# Patient Record
Sex: FEMALE | Race: WHITE | Employment: OTHER | URBAN - METROPOLITAN AREA
[De-identification: names, ages, dates, MRNs, and addresses within clinical notes are randomized per-mention and may not be internally consistent; named-entity substitution may affect disease eponyms.]

---

## 2017-03-20 ENCOUNTER — TELEPHONE (OUTPATIENT)
Dept: OBGYN CLINIC | Facility: CLINIC | Age: 32
End: 2017-03-20

## 2017-03-20 NOTE — TELEPHONE ENCOUNTER
Pt confirms message below and LMP of 2/17/17. Pt stated she is not taking PNV yet and pt was instructed to buy PNV with [facility]. Pt informed that our practice has 6 doctors (2 male, 4 female) and pt will be required to see all doctors throughout care.  Pt cynthia

## 2017-04-15 ENCOUNTER — NURSE ONLY (OUTPATIENT)
Dept: OBGYN CLINIC | Facility: CLINIC | Age: 32
End: 2017-04-15

## 2017-04-15 ENCOUNTER — LAB ENCOUNTER (OUTPATIENT)
Dept: LAB | Facility: HOSPITAL | Age: 32
End: 2017-04-15
Attending: OBSTETRICS & GYNECOLOGY
Payer: COMMERCIAL

## 2017-04-15 VITALS — WEIGHT: 153.38 LBS | HEIGHT: 66.5 IN | BODY MASS INDEX: 24.36 KG/M2

## 2017-04-15 DIAGNOSIS — Z34.81 ENCOUNTER FOR SUPERVISION OF OTHER NORMAL PREGNANCY IN FIRST TRIMESTER: Primary | ICD-10-CM

## 2017-04-15 DIAGNOSIS — Z34.81 ENCOUNTER FOR SUPERVISION OF OTHER NORMAL PREGNANCY IN FIRST TRIMESTER: ICD-10-CM

## 2017-04-15 DIAGNOSIS — Z32.01 PREGNANCY EXAMINATION OR TEST, POSITIVE RESULT: ICD-10-CM

## 2017-04-15 PROCEDURE — 87389 HIV-1 AG W/HIV-1&-2 AB AG IA: CPT

## 2017-04-15 PROCEDURE — 81025 URINE PREGNANCY TEST: CPT | Performed by: OBSTETRICS & GYNECOLOGY

## 2017-04-15 PROCEDURE — 86762 RUBELLA ANTIBODY: CPT

## 2017-04-15 PROCEDURE — 86780 TREPONEMA PALLIDUM: CPT

## 2017-04-15 PROCEDURE — 86901 BLOOD TYPING SEROLOGIC RH(D): CPT

## 2017-04-15 PROCEDURE — 85025 COMPLETE CBC W/AUTO DIFF WBC: CPT

## 2017-04-15 PROCEDURE — 87340 HEPATITIS B SURFACE AG IA: CPT

## 2017-04-15 PROCEDURE — 86900 BLOOD TYPING SEROLOGIC ABO: CPT

## 2017-04-15 PROCEDURE — 36415 COLL VENOUS BLD VENIPUNCTURE: CPT

## 2017-04-15 PROCEDURE — 86803 HEPATITIS C AB TEST: CPT

## 2017-04-15 PROCEDURE — 86850 RBC ANTIBODY SCREEN: CPT

## 2017-04-15 RX ORDER — PRENATAL VIT/IRON FUM/FOLIC AC 27MG-0.8MG
1 TABLET ORAL DAILY
COMMUNITY
End: 2018-02-01 | Stop reason: ALTCHOICE

## 2017-04-15 NOTE — PROGRESS NOTES
Pt seen for OBN appt today with no complaints. Normal PN labs and Hep C ordered. Pt advised all labs must be completed and resulted prior to MD appt. NPN appt with MD scheduled on 4/17/17 with MELA.   Pt states with last pregnancy she had significant vagi greater than 35 No    Canavan Disease  No    Cystic Fibrosis No    Down Syndrome No    Hemophilia No    Estero's Chorea No    Mental Retardation/Autism No    Muscular Dystrophy No    Neural tube defects No    Sickle Cell Disease or trait No    Damián-Sach

## 2017-04-17 ENCOUNTER — INITIAL PRENATAL (OUTPATIENT)
Dept: OBGYN CLINIC | Facility: CLINIC | Age: 32
End: 2017-04-17

## 2017-04-17 VITALS
SYSTOLIC BLOOD PRESSURE: 97 MMHG | WEIGHT: 155 LBS | BODY MASS INDEX: 25 KG/M2 | DIASTOLIC BLOOD PRESSURE: 61 MMHG | HEART RATE: 69 BPM

## 2017-04-17 DIAGNOSIS — Z34.81 ENCOUNTER FOR SUPERVISION OF OTHER NORMAL PREGNANCY IN FIRST TRIMESTER: Primary | ICD-10-CM

## 2017-04-17 PROCEDURE — 76815 OB US LIMITED FETUS(S): CPT | Performed by: OBSTETRICS & GYNECOLOGY

## 2017-04-18 ENCOUNTER — TELEPHONE (OUTPATIENT)
Dept: OBGYN CLINIC | Facility: CLINIC | Age: 32
End: 2017-04-18

## 2017-04-18 DIAGNOSIS — Z34.81 ENCOUNTER FOR SUPERVISION OF OTHER NORMAL PREGNANCY IN FIRST TRIMESTER: Primary | ICD-10-CM

## 2017-04-19 ENCOUNTER — TELEPHONE (OUTPATIENT)
Dept: PERINATAL CARE | Facility: HOSPITAL | Age: 32
End: 2017-04-19

## 2017-04-19 NOTE — TELEPHONE ENCOUNTER
[facility] and she can call [phone] to schedule an appointment.

## 2017-04-19 NOTE — PROGRESS NOTES
Normal Pap 1/18/16. Wishes for FTS. Gc/Chl screen done. Never did urine culture because \"gave urine sample\" in office right before.  RTC 4 wks

## 2017-05-15 ENCOUNTER — ROUTINE PRENATAL (OUTPATIENT)
Dept: OBGYN CLINIC | Facility: CLINIC | Age: 32
End: 2017-05-15

## 2017-05-15 VITALS
WEIGHT: 159.63 LBS | HEART RATE: 90 BPM | DIASTOLIC BLOOD PRESSURE: 68 MMHG | SYSTOLIC BLOOD PRESSURE: 101 MMHG | BODY MASS INDEX: 25 KG/M2

## 2017-05-15 DIAGNOSIS — Z34.91 ENCOUNTER FOR SUPERVISION OF NORMAL PREGNANCY IN FIRST TRIMESTER, UNSPECIFIED GRAVIDITY: Primary | ICD-10-CM

## 2017-05-17 ENCOUNTER — HOSPITAL ENCOUNTER (OUTPATIENT)
Dept: PERINATAL CARE | Facility: HOSPITAL | Age: 32
Discharge: HOME | End: 2017-05-17
Attending: OBSTETRICS & GYNECOLOGY
Payer: COMMERCIAL

## 2017-05-17 VITALS — SYSTOLIC BLOOD PRESSURE: 105 MMHG | HEART RATE: 80 BPM | DIASTOLIC BLOOD PRESSURE: 75 MMHG

## 2017-05-17 DIAGNOSIS — Z36.9 FIRST TRIMESTER SCREENING: Primary | ICD-10-CM

## 2017-05-17 DIAGNOSIS — F17.200 SMOKER: ICD-10-CM

## 2017-05-17 DIAGNOSIS — Z36.9 FIRST TRIMESTER SCREENING: ICD-10-CM

## 2017-05-17 PROCEDURE — 99241 OFFICE CONSULTATION,LEVEL I: CPT | Performed by: OBSTETRICS & GYNECOLOGY

## 2017-05-17 PROCEDURE — 76813 OB US NUCHAL MEAS 1 GEST: CPT | Performed by: OBSTETRICS & GYNECOLOGY

## 2017-05-17 NOTE — PROGRESS NOTES
Outpatient Maternal-Fetal Medicine Consultation    Dear Dr. Cornelio Duarte,    Thank you for requesting ultrasound evaluation and maternal fetal medicine consultation on your patient Chapito Hernández.   As you are aware she is a 36year old female with a Cross preg non-tender  Extremities: non-tender, no edema    OBSTETRIC ULTRASOUND  A first trimester ultrasound was performed prior to the consultation which I interpreted and discussed with the patient and her , Gali Escamilla.   The CRL is consistent with dates and the

## 2017-05-17 NOTE — PROGRESS NOTES
Pt for 1st tri screening  Hx varicosities in lower exterm  Denies pregnancy complaints  ntd lab card lot # [id number]

## 2017-05-22 ENCOUNTER — TELEPHONE (OUTPATIENT)
Dept: PERINATAL CARE | Facility: HOSPITAL | Age: 32
End: 2017-05-22

## 2017-05-22 NOTE — TELEPHONE ENCOUNTER
First trimester screen results reviewed by Dr. Bee Egan for Down Syndrome is 1 in 2688  Risk for Trisomy 13 and 18 in 1 in > 10,000  Laddarci Pickering contacted by phone and was given the results. She verbalized understanding.   Report sent for scanning into

## 2017-06-12 ENCOUNTER — ROUTINE PRENATAL (OUTPATIENT)
Dept: OBGYN CLINIC | Facility: CLINIC | Age: 32
End: 2017-06-12

## 2017-06-12 VITALS
SYSTOLIC BLOOD PRESSURE: 102 MMHG | WEIGHT: 162 LBS | HEART RATE: 90 BPM | DIASTOLIC BLOOD PRESSURE: 68 MMHG | BODY MASS INDEX: 26 KG/M2

## 2017-06-12 DIAGNOSIS — Z34.92 ENCOUNTER FOR SUPERVISION OF NORMAL PREGNANCY IN SECOND TRIMESTER, UNSPECIFIED GRAVIDITY: Primary | ICD-10-CM

## 2017-06-19 ENCOUNTER — ROUTINE PRENATAL (OUTPATIENT)
Dept: OBGYN CLINIC | Facility: CLINIC | Age: 32
End: 2017-06-19

## 2017-06-19 VITALS
BODY MASS INDEX: 26 KG/M2 | WEIGHT: 163 LBS | SYSTOLIC BLOOD PRESSURE: 94 MMHG | DIASTOLIC BLOOD PRESSURE: 60 MMHG | HEART RATE: 69 BPM

## 2017-06-19 DIAGNOSIS — Z34.82 ENCOUNTER FOR SUPERVISION OF OTHER NORMAL PREGNANCY IN SECOND TRIMESTER: Primary | ICD-10-CM

## 2017-06-19 PROCEDURE — 81002 URINALYSIS NONAUTO W/O SCOPE: CPT | Performed by: OBSTETRICS & GYNECOLOGY

## 2017-06-19 PROCEDURE — 99213 OFFICE O/P EST LOW 20 MIN: CPT | Performed by: OBSTETRICS & GYNECOLOGY

## 2017-06-19 NOTE — PROGRESS NOTES
Slight pinkish d/c. External cx 1 cm but firmly closed w/in 1/2 cm -- cx long. Pelvic rest until u/s.  (+) vaginal variocosities

## 2017-07-10 ENCOUNTER — HOSPITAL ENCOUNTER (OUTPATIENT)
Dept: ULTRASOUND IMAGING | Age: 32
Discharge: HOME | End: 2017-07-10
Attending: OBSTETRICS & GYNECOLOGY
Payer: COMMERCIAL

## 2017-07-10 DIAGNOSIS — Z34.92 ENCOUNTER FOR SUPERVISION OF NORMAL PREGNANCY IN SECOND TRIMESTER, UNSPECIFIED GRAVIDITY: ICD-10-CM

## 2017-07-10 PROCEDURE — 76805 OB US >/= 14 WKS SNGL FETUS: CPT | Performed by: OBSTETRICS & GYNECOLOGY

## 2017-07-11 ENCOUNTER — ROUTINE PRENATAL (OUTPATIENT)
Dept: OBGYN CLINIC | Facility: CLINIC | Age: 32
End: 2017-07-11

## 2017-07-11 VITALS
HEART RATE: 72 BPM | SYSTOLIC BLOOD PRESSURE: 112 MMHG | WEIGHT: 169.63 LBS | DIASTOLIC BLOOD PRESSURE: 76 MMHG | BODY MASS INDEX: 27 KG/M2

## 2017-07-11 DIAGNOSIS — Z34.92 ENCOUNTER FOR SUPERVISION OF NORMAL PREGNANCY IN SECOND TRIMESTER, UNSPECIFIED GRAVIDITY: Primary | ICD-10-CM

## 2017-07-11 LAB
MULTISTIX LOT#: NORMAL NUMERIC
PH, URINE: 6 (ref 4.5–8)
SPECIFIC GRAVITY: 1.01 (ref 1–1.03)
UROBILINOGEN,SEMI-QN: 0 MG/DL (ref 0–1.9)

## 2017-07-11 NOTE — PROGRESS NOTES
Feeling some vaginal pressure. No spotting or cramping. Reviewed hydration and rest. Reviewed support/compression for varicose veins.  Reviewed 20 wk US   RTC 4 wks

## 2017-07-12 ENCOUNTER — TELEPHONE (OUTPATIENT)
Dept: PEDIATRICS CLINIC | Facility: CLINIC | Age: 32
End: 2017-07-12

## 2017-07-12 NOTE — TELEPHONE ENCOUNTER
Pt needs a letter from DR. Malcolm confirming her pregnancy and stating her due date for work purposes. Pt would like the letter faxed to her job #[phone] ATTN:Pennie    Pt also states fax machine will start working after 1 pm 7/12.    Ok to lv vm once se

## 2017-07-15 ENCOUNTER — TELEPHONE (OUTPATIENT)
Dept: OBGYN CLINIC | Facility: CLINIC | Age: 32
End: 2017-07-15

## 2017-07-15 DIAGNOSIS — O42.90 PREMATURE RUPTURE OF MEMBRANES, UNSPECIFIED DURATION TO ONSET OF LABOR, UNSPECIFIED GESTATIONAL AGE: Primary | ICD-10-CM

## 2017-07-15 PROBLEM — Z34.90 PREGNANCY, OBSTETRICAL CARE: Status: ACTIVE | Noted: 2017-07-15

## 2017-07-15 PROBLEM — Z34.90 PREGNANCY, OBSTETRICAL CARE (HCC): Status: ACTIVE | Noted: 2017-07-15

## 2017-07-15 NOTE — TELEPHONE ENCOUNTER
MESSAGE REVIEWED WITH CAP AND PT IS TO STAY THERE AND GET IV ANTIBIOTICS FIRST. WILL SEND TO [facility] ASAP WHEN SHE RETURNS. PT NOTIFIED OF THIS INFO. BELIEVES SHE WILL BE HOME Monday EVENING.   PT AWARE WE WILL MAKE ARRANGEMENTS FOR HER TO BE SEEN ASAP WITH

## 2017-07-15 NOTE — TELEPHONE ENCOUNTER
Per the pt she is 21 weeks pregnant, and her membrane ruptured. The pt states that she is out of state, and has been admitted to the hospital.  Please advise.

## 2017-07-15 NOTE — TELEPHONE ENCOUNTER
Pt is 21w1d, calling to report her water broke last night and she is in [place].  Pt states she was admitted to [address] [place] [address] last night around 5 pm. Pt was told she can either do 48 hours of IV abx then fly home to continue the oral abx or fly home now and

## 2017-07-15 NOTE — TELEPHONE ENCOUNTER
Pageliliana while on call. Pt in [place] and [facility]. She is getting IV Abx until Monday evening. She will fly home then and be on Oral Abx. She has requested to see MFM at [facility]).  Pt will need apt immediately on Tuesday or Wednesday to discuss plan of ca

## 2017-07-17 NOTE — TELEPHONE ENCOUNTER
NOTIFIED PT THAT NETTA WILL CALL HER ONCE THE ORDER HAS BEEN FAXED TO [facility] AND THE URGENT REFERRAL IS IN PROCESS. I DID GIVE HER [facility] PHONE NUMBER. PT AWARE SHE WILL NEED TO SEE MFM FIRST FOR CONSULT AND RECS AND THEN CARE WILL BE TRANSFERRED.   ONCE

## 2017-07-18 ENCOUNTER — HOSPITAL ENCOUNTER (OUTPATIENT)
Dept: PERINATAL CARE | Facility: HOSPITAL | Age: 32
Discharge: HOME | End: 2017-07-18
Attending: OBSTETRICS & GYNECOLOGY
Payer: COMMERCIAL

## 2017-07-18 VITALS
SYSTOLIC BLOOD PRESSURE: 114 MMHG | TEMPERATURE: 99 F | HEART RATE: 94 BPM | DIASTOLIC BLOOD PRESSURE: 70 MMHG | WEIGHT: 166.31 LBS | BODY MASS INDEX: 26 KG/M2

## 2017-07-18 DIAGNOSIS — Z34.92 PREGNANCY, OBSTETRICAL CARE, SECOND TRIMESTER: ICD-10-CM

## 2017-07-18 DIAGNOSIS — O42.90 PROM (PREMATURE RUPTURE OF MEMBRANES): ICD-10-CM

## 2017-07-18 DIAGNOSIS — O42.112 PRETERM PREMATURE RUPTURE OF MEMBRANES WITH ONSET OF LABOR MORE THAN 24 HOURS FOLLOWING RUPTURE IN SECOND TRIMESTER: ICD-10-CM

## 2017-07-18 DIAGNOSIS — O42.90 PROM (PREMATURE RUPTURE OF MEMBRANES): Primary | ICD-10-CM

## 2017-07-18 PROCEDURE — 76811 OB US DETAILED SNGL FETUS: CPT | Performed by: OBSTETRICS & GYNECOLOGY

## 2017-07-18 PROCEDURE — 99243 OFF/OP CNSLTJ NEW/EST LOW 30: CPT | Performed by: OBSTETRICS & GYNECOLOGY

## 2017-07-18 RX ORDER — AMOXICILLIN 500 MG/1
500 CAPSULE ORAL 3 TIMES DAILY
COMMUNITY
End: 2017-07-25 | Stop reason: ALTCHOICE

## 2017-07-18 RX ORDER — AZITHROMYCIN 250 MG/1
250 TABLET, FILM COATED ORAL DAILY
COMMUNITY
End: 2017-07-25 | Stop reason: ALTCHOICE

## 2017-07-18 NOTE — PROGRESS NOTES
Outpatient Maternal-Fetal Medicine Consultation    Dear Dr. Montrell Haywood,    Thank you for requesting ultrasound evaluation and maternal fetal medicine consultation on your patient Gely Clifton.   As you are aware she is a 36year old female with a [place] pr polypectomy; Mild intermittent childhood asthma without complication; Smoker (); and Varicose veins of legs. Past Surgical History  The patient  has a past surgical history that includes .     Family History  The patient has no family status smoking. Approximately one-third of women with PPROM develop potentially serious infections, such as intraamniotic infection (chorioamnionitis and funisitis), endometritis, or septicemia.    The fetus and  are at greater risk of PPROM-related than 34 weeks of gestation. Between 34-36 weeks it is more controversial because infection is now a greater risk. Some physicians will test for fetal lung maturity if a vaginal sample can be obtained.   A systematic review of studies of fetal outcome f

## 2017-07-18 NOTE — PROGRESS NOTES
Pt for ltd U/S  And MFM consult  Hx PPROM 7/14/17  Tx with abx   Denies leaking  States fetal mvmt  Pt states pink discharge

## 2017-07-21 ENCOUNTER — ROUTINE PRENATAL (OUTPATIENT)
Dept: OBGYN CLINIC | Facility: CLINIC | Age: 32
End: 2017-07-21

## 2017-07-21 ENCOUNTER — TELEPHONE (OUTPATIENT)
Dept: OBGYN CLINIC | Facility: CLINIC | Age: 32
End: 2017-07-21

## 2017-07-21 VITALS
SYSTOLIC BLOOD PRESSURE: 106 MMHG | HEART RATE: 120 BPM | WEIGHT: 164.81 LBS | BODY MASS INDEX: 26 KG/M2 | DIASTOLIC BLOOD PRESSURE: 75 MMHG

## 2017-07-21 DIAGNOSIS — Z3A.22 22 WEEKS GESTATION OF PREGNANCY: Primary | ICD-10-CM

## 2017-07-21 LAB
MULTISTIX LOT#: NORMAL NUMERIC
SPECIFIC GRAVITY: 1.01 (ref 1–1.03)
UROBILINOGEN,SEMI-QN: 0 MG/DL (ref 0–1.9)

## 2017-07-21 NOTE — TELEPHONE ENCOUNTER
ON CALL--  Spoke with pt at 0730. PPROM at 21 wks. Still leaking fluid. Has seen pink discharge. This am, noticed more red blood. No cramping or pelvic pressure.   Will see pt at office this am.

## 2017-07-21 NOTE — PROGRESS NOTES
Problem visit---Pt presents today c/o VB. Has had pink tinged LOF since PPROM dx last week however this morning had bright red watery bleeding, filling half her pad. Denies ctx, cramping, abdominal pain, fevers, foul smelling discharge. Reports good FM.

## 2017-07-22 ENCOUNTER — HOSPITAL ENCOUNTER (OUTPATIENT)
Facility: HOSPITAL | Age: 32
Setting detail: OBSERVATION
Discharge: HOME | End: 2017-07-22
Attending: OBSTETRICS & GYNECOLOGY | Admitting: OBSTETRICS & GYNECOLOGY
Payer: COMMERCIAL

## 2017-07-22 VITALS
DIASTOLIC BLOOD PRESSURE: 66 MMHG | RESPIRATION RATE: 16 BRPM | BODY MASS INDEX: 26.36 KG/M2 | HEIGHT: 66 IN | HEART RATE: 94 BPM | WEIGHT: 164 LBS | TEMPERATURE: 98 F | SYSTOLIC BLOOD PRESSURE: 104 MMHG

## 2017-07-22 PROBLEM — Z34.90 PREGNANCY: Status: ACTIVE | Noted: 2017-07-22

## 2017-07-22 PROBLEM — Z34.90 PREGNANCY (HCC): Status: ACTIVE | Noted: 2017-07-22

## 2017-07-22 LAB
BASOPHILS # BLD AUTO: 0.04 X10(3) UL (ref 0–0.1)
BASOPHILS NFR BLD AUTO: 0.3 %
EOSINOPHIL # BLD AUTO: 0.17 X10(3) UL (ref 0–0.3)
EOSINOPHIL NFR BLD AUTO: 1.3 %
ERYTHROCYTE [DISTWIDTH] IN BLOOD BY AUTOMATED COUNT: 12.5 % (ref 11.5–16)
HCT VFR BLD AUTO: 34.7 % (ref 34–50)
HGB BLD-MCNC: 12.1 G/DL (ref 12–16)
IMMATURE GRANULOCYTE COUNT: 0.27 X10(3) UL (ref 0–1)
IMMATURE GRANULOCYTE RATIO %: 2.1 %
LYMPHOCYTES # BLD AUTO: 2.23 X10(3) UL (ref 0.9–4)
LYMPHOCYTES NFR BLD AUTO: 17.5 %
MCH RBC QN AUTO: 31.1 PG (ref 27–33.2)
MCHC RBC AUTO-ENTMCNC: 34.9 G/DL (ref 31–37)
MCV RBC AUTO: 89.2 FL (ref 81–100)
MONOCYTES # BLD AUTO: 0.9 X10(3) UL (ref 0.1–0.6)
MONOCYTES NFR BLD AUTO: 7.1 %
NEUTROPHIL ABS PRELIM: 9.14 X10 (3) UL (ref 1.3–6.7)
NEUTROPHILS # BLD AUTO: 9.14 X10(3) UL (ref 1.3–6.7)
NEUTROPHILS NFR BLD AUTO: 71.7 %
PLATELET # BLD AUTO: 271 10(3)UL (ref 150–450)
RBC # BLD AUTO: 3.89 X10(6)UL (ref 3.8–5.1)
RED CELL DISTRIBUTION WIDTH-SD: 40.5 FL (ref 35.1–46.3)
WBC # BLD AUTO: 12.8 X10(3) UL (ref 4–13)

## 2017-07-22 PROCEDURE — 99219 INITIAL OBSERVATION CARE,LEVL II: CPT | Performed by: OBSTETRICS & GYNECOLOGY

## 2017-07-22 NOTE — NST
Nonstress Test   Patient: Pérez Benjamin    Gestation: 22w1d    NST:                                            Baseline: 175 BPM                                                           Nonstress Test Interpretation: Appropriate for gestational age Falguni Skinner

## 2017-07-22 NOTE — PROGRESS NOTES
Pt is d/c'd home in stable condition. Both written and verbal instructions provided. Questions answered. Pt verbalizes understanding and agreement.

## 2017-07-22 NOTE — H&P
35 YO  with an BEAU of 2017, 22w 1d EGA admitted due to abdominal cramping. Hx PPROM on 2017 while in [place]. Was hospitalized for IV antibiotics and then discharged to return home. finished oral antibiotics today.  Was seen b discharge. Cervix visually closed. No lesions. No SVE done. cm dilated. Extremities:  No tenderness or edema. IMP:  22 week pregnancy with pre viable PPROM. PLAN:  CBC. Expect discharge.  To call primary ob group or our group if abdominal pain or con

## 2017-07-22 NOTE — PROGRESS NOTES
CHASTITY falcon a  at 22 1/7 wk iup who known SROM on  @1900 who is brought to room triage -5 w/ c/o cramping since 1200 today. Pt has noted leaking of clear fluid with pink tinge.  Pt reports completing her abx today and a POC to admit at apporx 23 wk.  UA o

## 2017-07-25 ENCOUNTER — ROUTINE PRENATAL (OUTPATIENT)
Dept: OBGYN CLINIC | Facility: CLINIC | Age: 32
End: 2017-07-25

## 2017-07-25 ENCOUNTER — HOSPITAL ENCOUNTER (OUTPATIENT)
Facility: HOSPITAL | Age: 32
Setting detail: OBSERVATION
Discharge: HOME | End: 2017-07-25
Attending: OBSTETRICS & GYNECOLOGY | Admitting: OBSTETRICS & GYNECOLOGY
Payer: COMMERCIAL

## 2017-07-25 VITALS
WEIGHT: 168 LBS | HEART RATE: 94 BPM | DIASTOLIC BLOOD PRESSURE: 66 MMHG | SYSTOLIC BLOOD PRESSURE: 99 MMHG | BODY MASS INDEX: 27 KG/M2

## 2017-07-25 VITALS — TEMPERATURE: 98 F

## 2017-07-25 DIAGNOSIS — Z3A.22 22 WEEKS GESTATION OF PREGNANCY: Primary | ICD-10-CM

## 2017-07-25 PROBLEM — O36.8390 ABNORMALITY IN FETAL HEART RATE/RHYTHM, ANTEPARTUM: Status: ACTIVE | Noted: 2017-07-25

## 2017-07-25 PROBLEM — O36.8390: Status: ACTIVE | Noted: 2017-07-25

## 2017-07-25 LAB
MULTISTIX LOT#: NORMAL NUMERIC
PH, URINE: 6 (ref 4.5–8)
SPECIFIC GRAVITY: 1.02 (ref 1–1.03)
UROBILINOGEN,SEMI-QN: 0.2 MG/DL (ref 0–1.9)

## 2017-07-25 PROCEDURE — 99212 OFFICE O/P EST SF 10 MIN: CPT

## 2017-07-25 NOTE — TRIAGE
[facility]      Triage Note    Blair Bowers Patient Status:  Observation    10/15/1985 MRN [id number]   Location [address]  Attending Raj Young MD   Hosp Day # 0 [facility]

## 2017-07-25 NOTE — PROGRESS NOTES
Pt denies fevers and chills, fundal tenderness, foul smelling discharge, vaginal bleeding. She denies contractions and cramping. She feels fetal movement. When doppling the baby fetal heart tones 130s and then into the 70s.  Rolled patient on her side and s

## 2017-07-28 ENCOUNTER — TELEPHONE (OUTPATIENT)
Dept: OBGYN UNIT | Facility: HOSPITAL | Age: 32
End: 2017-07-28

## 2017-07-31 ENCOUNTER — HOSPITAL ENCOUNTER (INPATIENT)
Dept: OBGYN CLINIC | Facility: HOSPITAL | Age: 32
Discharge: HOME | End: 2017-07-31
Payer: COMMERCIAL

## 2017-07-31 ENCOUNTER — HOSPITAL ENCOUNTER (INPATIENT)
Facility: HOSPITAL | Age: 32
LOS: 18 days | Discharge: HOME | End: 2017-08-18
Attending: OBSTETRICS & GYNECOLOGY | Admitting: OBSTETRICS & GYNECOLOGY
Payer: COMMERCIAL

## 2017-07-31 LAB
ANTIBODY SCREEN: NEGATIVE
BASOPHILS # BLD AUTO: 0.03 X10(3) UL (ref 0–0.1)
BASOPHILS NFR BLD AUTO: 0.3 %
EOSINOPHIL # BLD AUTO: 0.08 X10(3) UL (ref 0–0.3)
EOSINOPHIL NFR BLD AUTO: 0.8 %
ERYTHROCYTE [DISTWIDTH] IN BLOOD BY AUTOMATED COUNT: 13.1 % (ref 11.5–16)
HCT VFR BLD AUTO: 34.5 % (ref 34–50)
HGB BLD-MCNC: 12 G/DL (ref 12–16)
IMMATURE GRANULOCYTE COUNT: 0.08 X10(3) UL (ref 0–1)
IMMATURE GRANULOCYTE RATIO %: 0.8 %
LYMPHOCYTES # BLD AUTO: 1.64 X10(3) UL (ref 0.9–4)
LYMPHOCYTES NFR BLD AUTO: 16.4 %
MCH RBC QN AUTO: 31.3 PG (ref 27–33.2)
MCHC RBC AUTO-ENTMCNC: 34.8 G/DL (ref 31–37)
MCV RBC AUTO: 90.1 FL (ref 81–100)
MONOCYTES # BLD AUTO: 0.56 X10(3) UL (ref 0.1–0.6)
MONOCYTES NFR BLD AUTO: 5.6 %
NEUTROPHIL ABS PRELIM: 7.62 X10 (3) UL (ref 1.3–6.7)
NEUTROPHILS # BLD AUTO: 7.62 X10(3) UL (ref 1.3–6.7)
NEUTROPHILS NFR BLD AUTO: 76.1 %
PLATELET # BLD AUTO: 231 10(3)UL (ref 150–450)
RBC # BLD AUTO: 3.83 X10(6)UL (ref 3.8–5.1)
RED CELL DISTRIBUTION WIDTH-SD: 42.2 FL (ref 35.1–46.3)
RH BLOOD TYPE: POSITIVE
T PALLIDUM AB SER QL IA: NONREACTIVE
WBC # BLD AUTO: 10 X10(3) UL (ref 4–13)

## 2017-07-31 PROCEDURE — 99223 1ST HOSP IP/OBS HIGH 75: CPT | Performed by: OBSTETRICS & GYNECOLOGY

## 2017-07-31 RX ORDER — CHOLECALCIFEROL (VITAMIN D3) 25 MCG
1 TABLET,CHEWABLE ORAL DAILY
Status: DISCONTINUED | OUTPATIENT
Start: 2017-07-31 | End: 2017-08-17

## 2017-07-31 RX ORDER — ACETAMINOPHEN 500 MG
500 TABLET ORAL EVERY 6 HOURS PRN
Status: DISCONTINUED | OUTPATIENT
Start: 2017-07-31 | End: 2017-08-17

## 2017-07-31 RX ORDER — DOCUSATE SODIUM 100 MG/1
100 CAPSULE, LIQUID FILLED ORAL 2 TIMES DAILY
Status: DISCONTINUED | OUTPATIENT
Start: 2017-07-31 | End: 2017-08-13

## 2017-07-31 RX ORDER — BETAMETHASONE SODIUM PHOSPHATE AND BETAMETHASONE ACETATE 3; 3 MG/ML; MG/ML
12 INJECTION, SUSPENSION INTRA-ARTICULAR; INTRALESIONAL; INTRAMUSCULAR; SOFT TISSUE EVERY 24 HOURS
Status: COMPLETED | OUTPATIENT
Start: 2017-07-31 | End: 2017-08-01

## 2017-07-31 RX ORDER — ZOLPIDEM TARTRATE 5 MG/1
5 TABLET ORAL NIGHTLY PRN
Status: DISCONTINUED | OUTPATIENT
Start: 2017-07-31 | End: 2017-08-17

## 2017-07-31 RX ORDER — ACETAMINOPHEN 500 MG
1000 TABLET ORAL EVERY 6 HOURS PRN
Status: DISCONTINUED | OUTPATIENT
Start: 2017-07-31 | End: 2017-08-17

## 2017-07-31 RX ORDER — CALCIUM CARBONATE 200(500)MG
1000 TABLET,CHEWABLE ORAL
Status: DISCONTINUED | OUTPATIENT
Start: 2017-07-31 | End: 2017-08-17

## 2017-07-31 NOTE — PROGRESS NOTES
07/31/17 1000   CM/SW Referral Data   Referral Source Nurse;Social Work (self-referral)   Reason for Referral Psychoscial assessment   Informant Patient;Spouse   SW met with pt to provide support due being on antepartum. Pt is a 35 y/o female.  She p

## 2017-07-31 NOTE — H&P
Patient is 37 y/o , [facility] 17  PROM  while visiting in [place]  Patient was started on antibiotics, completed. Admit now for steroids, bedrest for duration.     PMH: neg  PSH: neg    ROS: No Cardiac, Respiratory, GI,  or Neurological sympt

## 2017-07-31 NOTE — PROGRESS NOTES
Pt d/c per w/c accompanied by OBT and boyfriend after written d/c instructions verbally explained and copy given to pt.

## 2017-07-31 NOTE — CONSULTS
[facility]  Maternal-Fetal Medicine Inpatient Consultation    Date of Admission:  2017  Date of Consult:  2017    Reason for Consult:   PROM    History of Present Illness:  Owen Brown is a a(n) 36year old female  with an IUP at 1 Term 12/09/14 38w0d  7 lb 3 oz (3.26 kg) F NORMAL SPONT EPI N JIGNA      Birth Comments: Pt states she had bleeding at 8 weeks and the morning of delivery          Other Relevant History:  Past Medical History:   Diagnosis Date   • Decorative tattoo the onset of uterine contractions;  PROM (PPROM) is the term used when the pregnancy is less than 37 completed weeks of gestation.  PPROM occurs in 3 percent of pregnancies and is responsible for, or associated with, approximately one-third of preter administering a seven-day course of antibiotic prophylaxis to all women with PPROM who are being managed expectantly.  Ampicillin 2 g intravenously every six hours for 48 hours, followed by amoxicillin (500 mg orally three times daily or 875 mg orally twice

## 2017-08-01 PROCEDURE — 99231 SBSQ HOSP IP/OBS SF/LOW 25: CPT | Performed by: OBSTETRICS & GYNECOLOGY

## 2017-08-01 NOTE — PROGRESS NOTES
Pt resting comfortably in bed watching Netflix, denies concerns. Continues to leak \"reddish fluid\". Denies cramping, ctx's or discomforts. + FM

## 2017-08-01 NOTE — CM/SW NOTE
Team rounds done on this patient. Team reviewed patient orders, patient plan of care, and possible discharge needs. Team memebers present: Dot Layer RN and RN caring for patient.

## 2017-08-01 NOTE — CONSULTS
[facility]  Maternal Fetal Medicine Progress Note    Gem Villafana Patient Status:  Inpatient    10/15/1985 MRN [id number]   Location [address] [facility] Attending Leigh Suh MD   Hosp Day # 1 PCP Shanel Botello MD     SUBJECTIVE

## 2017-08-01 NOTE — IMAGING NOTE
Indication: pprom. ____________________________________________________________________________  History: Age: 36 years.  : 2 Para: 1.  ____________________________________________________________________________  Dating:  LMP: 2017 EDC: 11/2

## 2017-08-01 NOTE — BH PROGRESS NOTE
Maternal Mental Health Consultation: Writer was requested to see patient due to anticipated extended hospital stay on ante partum unit due to PROM. Behaviors: The patient demonstrated no impairment in general appearance or behavior.  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      Plan: The patient will complete the EPDS and retu

## 2017-08-01 NOTE — CONSULTS
NICU PRENATAL CONSULT NOTE    I had the pleasure of speaking with Kennedy Trinidad today regarding her 23 week pregnancy complicated by PPROM since 20 weeks.  I spoke with the family regarding the complications surrounding a 21 week gestational age baby BM was also emphasized. 7) Risk for infection and potential use of empiric antibiotic therapy. 8) Apnea of prematurity and the use of caffeine for management.   9) Discharge criteria including the need to be maintaining temperature in an open crib, gain

## 2017-08-02 PROCEDURE — 99231 SBSQ HOSP IP/OBS SF/LOW 25: CPT | Performed by: OBSTETRICS & GYNECOLOGY

## 2017-08-02 NOTE — CONSULTS
[facility]  Maternal Fetal Medicine Progress Note    Gem Villafana Patient Status:  Inpatient    10/15/1985 MRN [id number]   Location [address] [facility] Attending Leigh Suh MD   Hosp Day # 2 PCP Shanel Botello MD     SUBJECTIVE

## 2017-08-02 NOTE — PROGRESS NOTES
EGA 23w 5d    Doing well today. Did have some bloody discharge over weekend prior to admission. None since and no leakage over last 24 hours. +FM. No cramping.     /59   Pulse 91   Temp (!) 97.4 °F (36.3 °C) (Oral)   Resp 18   Ht 66\"   Wt 170 lb   LM

## 2017-08-02 NOTE — PLAN OF CARE
Problem: ANTEPARTUM/LABOR and DELIVERY  Goal: Anxiety is at manageable level  INTERVENTIONS:  - [place] patient to unit/surroundings  - Establish a trusting relationship with patient  - Discuss possible complications or alterations in birth plan  - Explain

## 2017-08-03 NOTE — PROGRESS NOTES
EGA 23w 3d    Some bloody vaginal discharge this morning with urination. None since. No abdominal cramping. No change in FM.     /56 (BP Location: Left arm)   Pulse 78   Temp (!) 97.5 °F (36.4 °C) (Oral)   Resp 18   Ht 66\"   Wt 170 lb   LMP 02/17/201

## 2017-08-03 NOTE — PROGRESS NOTES
now 23 6/7 weeks with PPROM on 2017. Admitted on 2017 following treatment hospitalized in [place]. Received antibiotics there. Betamethasone completed on 2017. FHT's 155. Pt denies cramping or tyrell. Leaking clear fluid.

## 2017-08-04 PROCEDURE — 99232 SBSQ HOSP IP/OBS MODERATE 35: CPT | Performed by: OBSTETRICS & GYNECOLOGY

## 2017-08-04 RX ORDER — POLYETHYLENE GLYCOL 3350 17 G/17G
17 POWDER, FOR SOLUTION ORAL DAILY PRN
Status: DISCONTINUED | OUTPATIENT
Start: 2017-08-04 | End: 2017-08-17

## 2017-08-04 NOTE — PLAN OF CARE
Problem: ANTEPARTUM/LABOR and DELIVERY  Goal: Anxiety is at manageable level  INTERVENTIONS:  - [place] patient to unit/surroundings  - Establish a trusting relationship with patient  - Discuss possible complications or alterations in birth plan  - Explain

## 2017-08-04 NOTE — CONSULTS
[facility]  Maternal Fetal Medicine Progress Note    Eulalia Campuzano Patient Status:  Inpatient    10/15/1985 MRN [id number]   Location [address] [facility] Attending Stuart Foley MD   Hosp Day # 4 PCP Iraj Childs MD     SUBJECTIVE

## 2017-08-04 NOTE — PAYOR COMM NOTE
--------------  ADMISSION REVIEW     Payor: Prasanna Rain [place] #:  [id number]  Authorization Number: [id number]    Admit date: 7/31/17  Admit time: 5478       Admitting Physician: Ye Azul MD  Attending Physician:  MD CHASTITY Clancy

## 2017-08-05 PROCEDURE — 99231 SBSQ HOSP IP/OBS SF/LOW 25: CPT | Performed by: OBSTETRICS & GYNECOLOGY

## 2017-08-05 NOTE — PLAN OF CARE
Problem: GASTROINTESTINAL - ADULT  Goal: Achieves appropriate nutritional intake (bariatric)  INTERVENTIONS:  - Monitor for over-consumption  - Identify factors contributing to increased intake, treat as appropriate  - Monitor I&O, WT and lab values  - Obt

## 2017-08-05 NOTE — PLAN OF CARE
Problem: ANTEPARTUM/LABOR and DELIVERY  Goal: Maintain pregnancy as long as maternal and/or fetal condition is stable  INTERVENTIONS:  - Maternal surveillance  - Fetal surveillance  - Monitor uterine activity  - Medications as ordered  - Bedrest   Outcome: Maintain adequate hydration with IV or PO as ordered and tolerated  - Nasogastric tube to low intermittent suction as ordered  - Evaluate effectiveness of ordered antiemetic medications  - Provide nonpharmacologic comfort measures as appropriate  - Advance

## 2017-08-05 NOTE — PLAN OF CARE
Problem: GASTROINTESTINAL - ADULT  Goal: Maintains adequate nutritional intake (undernourished)  INTERVENTIONS:  - Monitor percentage of each meal consumed  - Identify factors contributing to decreased intake, treat as appropriate  - Assist with meals as n

## 2017-08-05 NOTE — PROGRESS NOTES
EGA 24w 1d    Some pinkish tinged fluid yesterday - occurs with fetal movement. No cramping or pain.      /59   Pulse 73   Temp (!) 97.2 °F (36.2 °C) (Temporal)   Resp 18   Ht 66\"   Wt 171 lb 9.6 oz   LMP 02/17/2017 (Exact Date)   BMI 27.70 kg/m²

## 2017-08-05 NOTE — PLAN OF CARE
Problem: ANTEPARTUM/LABOR and DELIVERY  Goal: Anxiety is at manageable level  INTERVENTIONS:  - [place] patient to unit/surroundings  - Establish a trusting relationship with patient  - Discuss possible complications or alterations in birth plan  - Explain

## 2017-08-06 PROCEDURE — 59025 FETAL NON-STRESS TEST: CPT | Performed by: OBSTETRICS & GYNECOLOGY

## 2017-08-06 PROCEDURE — 99231 SBSQ HOSP IP/OBS SF/LOW 25: CPT | Performed by: OBSTETRICS & GYNECOLOGY

## 2017-08-06 NOTE — PLAN OF CARE
ANTEPARTUM/LABOR and DELIVERY    • Maintain pregnancy as long as maternal and/or fetal condition is stable Progressing    • Anxiety is at manageable level Progressing    • Demonstrates ability to cope with hospitalization/illness Progressing        GASTROI

## 2017-08-06 NOTE — PROGRESS NOTES
24w 2d    Doing okay. No leakage yesterday. No contractions or abdominal cramping. No change in FM.     /54 (BP Location: Right arm)   Pulse 87   Temp (!) 97.5 °F (36.4 °C) (Temporal)   Resp 15   Ht 66\"   Wt 171 lb 9.6 oz   LMP 02/17/2017 (Exact Date

## 2017-08-06 NOTE — PLAN OF CARE
Problem: ANTEPARTUM/LABOR and DELIVERY  Goal: Maintain pregnancy as long as maternal and/or fetal condition is stable  INTERVENTIONS:  - Maternal surveillance  - Fetal surveillance  - Monitor uterine activity  - Medications as ordered  - Bedrest   Outcome: function  INTERVENTIONS:  - Assess bowel function  - Maintain adequate hydration with IV or PO as ordered and tolerated  - Evaluate effectiveness of GI medications  - Encourage mobilization and activity  - Obtain nutritional consult as needed  - Establish

## 2017-08-07 LAB
BASOPHILS # BLD AUTO: 0.03 X10(3) UL (ref 0–0.1)
BASOPHILS NFR BLD AUTO: 0.3 %
EOSINOPHIL # BLD AUTO: 0.19 X10(3) UL (ref 0–0.3)
EOSINOPHIL NFR BLD AUTO: 1.8 %
ERYTHROCYTE [DISTWIDTH] IN BLOOD BY AUTOMATED COUNT: 13.2 % (ref 11.5–16)
HCT VFR BLD AUTO: 34.8 % (ref 34–50)
HGB BLD-MCNC: 11.8 G/DL (ref 12–16)
IMMATURE GRANULOCYTE COUNT: 0.16 X10(3) UL (ref 0–1)
IMMATURE GRANULOCYTE RATIO %: 1.5 %
LYMPHOCYTES # BLD AUTO: 2.61 X10(3) UL (ref 0.9–4)
LYMPHOCYTES NFR BLD AUTO: 24.7 %
MCH RBC QN AUTO: 31 PG (ref 27–33.2)
MCHC RBC AUTO-ENTMCNC: 33.9 G/DL (ref 31–37)
MCV RBC AUTO: 91.3 FL (ref 81–100)
MONOCYTES # BLD AUTO: 0.74 X10(3) UL (ref 0.1–0.6)
MONOCYTES NFR BLD AUTO: 7 %
NEUTROPHIL ABS PRELIM: 6.84 X10 (3) UL (ref 1.3–6.7)
NEUTROPHILS # BLD AUTO: 6.84 X10(3) UL (ref 1.3–6.7)
NEUTROPHILS NFR BLD AUTO: 64.7 %
PLATELET # BLD AUTO: 213 10(3)UL (ref 150–450)
RBC # BLD AUTO: 3.81 X10(6)UL (ref 3.8–5.1)
RED CELL DISTRIBUTION WIDTH-SD: 42.9 FL (ref 35.1–46.3)
WBC # BLD AUTO: 10.6 X10(3) UL (ref 4–13)

## 2017-08-07 PROCEDURE — 59025 FETAL NON-STRESS TEST: CPT | Performed by: OBSTETRICS & GYNECOLOGY

## 2017-08-07 PROCEDURE — 99231 SBSQ HOSP IP/OBS SF/LOW 25: CPT | Performed by: OBSTETRICS & GYNECOLOGY

## 2017-08-07 NOTE — CONSULTS
[facility]  Maternal Fetal Medicine Progress Note    Jordon Steiner Patient Status:  Inpatient    10/15/1985 MRN [id number]   Location [address] [facility] Attending Dannie Arenas MD   Hosp Day # 7 PCP Pino Lovelace MD     SUBJECTIVE

## 2017-08-07 NOTE — PAYOR COMM NOTE
--------------  CONTINUED STAY REVIEW    Payor: [place] #:  [id number]  Authorization Number: [id number]    Admit date: 7/31/17  Admit time: 7841    Admitting Physician: Dior Booth MD  Attending Physician:  Dior Booth, MEDICATIONS ADMINISTERED IN LAST 1 DAY:  docusate sodium (COLACE) cap 100 mg     Date Action Dose Route User    8/7/2017 1027 Given 100 mg Oral Jake Armenta RN    8/6/2017 2107 Given 100 mg Oral Marquis Chico RN      prenatal

## 2017-08-07 NOTE — PROGRESS NOTES
EGA 24w 3d    No abdominal pain or contractions. Still with pinkish discharge / leaking - no change from yesterday. Good FM.     BP 98/56 (BP Location: Right arm)   Pulse 78   Temp 97.9 °F (36.6 °C) (Temporal)   Resp 18   Ht 66\"   Wt 171 lb 9.6 oz   LMP 02

## 2017-08-08 PROCEDURE — 99231 SBSQ HOSP IP/OBS SF/LOW 25: CPT | Performed by: OBSTETRICS & GYNECOLOGY

## 2017-08-08 NOTE — CONSULTS
[facility]  Maternal Fetal Medicine Progress Note    Wagner Jacques Patient Status:  Inpatient    10/15/1985 MRN [id number]   Location [address] [facility] Attending Calos Ríos MD   Hosp Day # 8 PCP Tk Looney MD     SUBJECTIVE

## 2017-08-08 NOTE — PROGRESS NOTES
24w 4d  No C/O  Afebrile, VS stable  Nothing new  Scan: no fluid  Abdomen soft, non-tender  No contractions  CPM

## 2017-08-08 NOTE — CM/SW NOTE
Team rounds done on this patient. Team reviewed patient orders, patient plan of care, and possible discharge needs. Team memebers present: Samantha Albarran RN and RN caring for patient.

## 2017-08-08 NOTE — IMAGING NOTE
History: Age: 36 years. : 2 Para: 1. Last menstrual period: 2017.  ____________________________________________________________________________  General Evaluation:  Fetal heart activity: present.  Fetal heart rate: 156 bpm.   Presentation: cep

## 2017-08-09 NOTE — PLAN OF CARE
Maintain pregnancy as long as maternal and/or fetal condition is stable Progressing      Anxiety is at manageable level Progressing      Demonstrates ability to cope with hospitalization/illness Progressing      Minimal or absence of nausea and vomiting Pr

## 2017-08-10 PROCEDURE — 99231 SBSQ HOSP IP/OBS SF/LOW 25: CPT | Performed by: OBSTETRICS & GYNECOLOGY

## 2017-08-10 PROCEDURE — 59025 FETAL NON-STRESS TEST: CPT | Performed by: OBSTETRICS & GYNECOLOGY

## 2017-08-10 NOTE — PLAN OF CARE
Maintain pregnancy as long as maternal and/or fetal condition is stable Progressing      Demonstrates ability to cope with hospitalization/illness Progressing      Patient/Family Long Term Goal Progressing      Patient/Family Short Term Goal Progressing

## 2017-08-10 NOTE — PROGRESS NOTES
24w 6d    Doing well. Still with some leaking - clear yesterday. No cramping or abdominal pain. FM without change.     BP 92/55 (BP Location: Left arm)   Pulse 89   Temp 98.2 °F (36.8 °C) (Oral)   Resp 16   Ht 66\"   Wt 171 lb 9.6 oz   LMP 02/17/2017 (Exact

## 2017-08-10 NOTE — PROGRESS NOTES
37 y/o  now 24 weeks 6 days admitted with PPROM since 2017. Leaking clear fluid with no vag bleeding. Afebrile . No contx. Reactive NST. Steroids and antibiotics completed. Report to TOMMIE ESPINAL.

## 2017-08-11 PROCEDURE — 59025 FETAL NON-STRESS TEST: CPT | Performed by: OBSTETRICS & GYNECOLOGY

## 2017-08-11 PROCEDURE — 99231 SBSQ HOSP IP/OBS SF/LOW 25: CPT | Performed by: OBSTETRICS & GYNECOLOGY

## 2017-08-11 NOTE — PROGRESS NOTES
25 w 0d    No complaints or changes. Clear leakage yesterday. Good FM. No cramping.     BP 94/49 (BP Location: Left arm)   Pulse 74   Temp 98.1 °F (36.7 °C) (Oral)   Resp 16   Ht 66\"   Wt 171 lb 9.6 oz   LMP 02/17/2017 (Exact Date)   BMI 27.70 kg/m²     Ab

## 2017-08-11 NOTE — CM/SW NOTE
07/31/17 1000   CM/SW Referral Data   Referral Source Nurse;Social Work (self-referral)   Reason for Referral Psychoscial assessment   Informant Patient;Spouse     SW completed an assessment to identify needs and provide support due to antepartum admiss needs.    Pankaj Mckeon MSW, LCSW   for [address] at [facility]  Ph: [phone] or [address]

## 2017-08-11 NOTE — PROGRESS NOTES
Report received from [facility]. Pt denies any complaints. +FM, denies contractions and bleeding, LOF is the same and clear. POC discussed with pt, pt denies any questions. Call light within reach.

## 2017-08-12 LAB
BASOPHILS # BLD AUTO: 0.03 X10(3) UL (ref 0–0.1)
BASOPHILS NFR BLD AUTO: 0.3 %
EOSINOPHIL # BLD AUTO: 0.13 X10(3) UL (ref 0–0.3)
EOSINOPHIL NFR BLD AUTO: 1.2 %
ERYTHROCYTE [DISTWIDTH] IN BLOOD BY AUTOMATED COUNT: 13.3 % (ref 11.5–16)
HCT VFR BLD AUTO: 35.4 % (ref 34–50)
HGB BLD-MCNC: 12.1 G/DL (ref 12–16)
IMMATURE GRANULOCYTE COUNT: 0.12 X10(3) UL (ref 0–1)
IMMATURE GRANULOCYTE RATIO %: 1.1 %
LYMPHOCYTES # BLD AUTO: 1.93 X10(3) UL (ref 0.9–4)
LYMPHOCYTES NFR BLD AUTO: 17.5 %
MCH RBC QN AUTO: 31.3 PG (ref 27–33.2)
MCHC RBC AUTO-ENTMCNC: 34.2 G/DL (ref 31–37)
MCV RBC AUTO: 91.7 FL (ref 81–100)
MONOCYTES # BLD AUTO: 0.75 X10(3) UL (ref 0.1–0.6)
MONOCYTES NFR BLD AUTO: 6.8 %
NEUTROPHIL ABS PRELIM: 8.05 X10 (3) UL (ref 1.3–6.7)
NEUTROPHILS # BLD AUTO: 8.05 X10(3) UL (ref 1.3–6.7)
NEUTROPHILS NFR BLD AUTO: 73.1 %
PLATELET # BLD AUTO: 214 10(3)UL (ref 150–450)
RBC # BLD AUTO: 3.86 X10(6)UL (ref 3.8–5.1)
RED CELL DISTRIBUTION WIDTH-SD: 43.8 FL (ref 35.1–46.3)
WBC # BLD AUTO: 11 X10(3) UL (ref 4–13)

## 2017-08-12 PROCEDURE — 59025 FETAL NON-STRESS TEST: CPT | Performed by: OBSTETRICS & GYNECOLOGY

## 2017-08-12 PROCEDURE — 99231 SBSQ HOSP IP/OBS SF/LOW 25: CPT | Performed by: OBSTETRICS & GYNECOLOGY

## 2017-08-12 NOTE — CONSULTS
[facility]  Maternal Fetal Medicine Progress Note    Amalia Marilin Patient Status:  Inpatient    10/15/1985 MRN [id number]   Location [address] [facility] Attending Tiffanie Peck, [address] [facility] [address] Day # 12 PCP Rosa Ross MD     [address]

## 2017-08-12 NOTE — PROGRESS NOTES
25w 1d    Status quo. Leaking clear fluid occasionally. FM unchanged. No cramping or abdominal pain.      /59 (BP Location: Right arm)   Pulse 87   Temp 98.6 °F (37 °C) (Oral)   Resp 16   Ht 66\"   Wt 174 lb   LMP 02/17/2017 (Exact Date)   BMI 28.08 k

## 2017-08-12 NOTE — PLAN OF CARE
Problem: COPING  Goal: Pt/Family able to verbalize concerns and demonstrate effective coping strategies  INTERVENTIONS:  - Assist patient/family to identify coping skills, available support systems and cultural and spiritual values  - Provide emotional sup Progressing      Problem: Patient/Family Goals  Goal: Patient/Family Long Term Goal  Patient's Long Term Goal: Maintain pregnancy    Interventions:  - Multidisciplinary approach to provide holistic care  - Provide disease specific education and reinforceme (bariatric)  INTERVENTIONS:  - Monitor for over-consumption  - Identify factors contributing to increased intake, treat as appropriate  - Monitor I&O, WT and lab values  - Obtain nutritional consult as needed  - Evaluate psychosocial factors contributing t

## 2017-08-13 LAB
APTT PPP: 28.7 SECONDS (ref 25–34)
BASOPHILS # BLD AUTO: 0.02 X10(3) UL (ref 0–0.1)
BASOPHILS NFR BLD AUTO: 0.2 %
BILIRUB UR QL STRIP.AUTO: NEGATIVE
BILIRUBIN URINE: NEGATIVE
CONTROL RUN WITHIN 24 HOURS?: YES
EOSINOPHIL # BLD AUTO: 0.12 X10(3) UL (ref 0–0.3)
EOSINOPHIL NFR BLD AUTO: 1.1 %
ERYTHROCYTE [DISTWIDTH] IN BLOOD BY AUTOMATED COUNT: 13.3 % (ref 11.5–16)
FIBRINOGEN: 366 MG/DL (ref 200–446)
GLUCOSE UR STRIP.AUTO-MCNC: NEGATIVE MG/DL
GLUCOSE URINE: NEGATIVE
HCT VFR BLD AUTO: 34.8 % (ref 34–50)
HGB BLD-MCNC: 11.9 G/DL (ref 12–16)
IMMATURE GRANULOCYTE COUNT: 0.09 X10(3) UL (ref 0–1)
IMMATURE GRANULOCYTE RATIO %: 0.9 %
KETONE URINE: NEGATIVE
KETONES UR STRIP.AUTO-MCNC: NEGATIVE MG/DL
LYMPHOCYTES # BLD AUTO: 2.05 X10(3) UL (ref 0.9–4)
LYMPHOCYTES NFR BLD AUTO: 19.5 %
MCH RBC QN AUTO: 31.6 PG (ref 27–33.2)
MCHC RBC AUTO-ENTMCNC: 34.2 G/DL (ref 31–37)
MCV RBC AUTO: 92.6 FL (ref 81–100)
MONOCYTES # BLD AUTO: 0.77 X10(3) UL (ref 0.1–0.6)
MONOCYTES NFR BLD AUTO: 7.3 %
NEUTROPHIL ABS PRELIM: 7.45 X10 (3) UL (ref 1.3–6.7)
NEUTROPHILS # BLD AUTO: 7.45 X10(3) UL (ref 1.3–6.7)
NEUTROPHILS NFR BLD AUTO: 71 %
NITRITE UR QL STRIP.AUTO: NEGATIVE
NITRITE URINE: NEGATIVE
PH UR STRIP.AUTO: 7 [PH] (ref 4.5–8)
PH URINE: 7 (ref 5–8)
PLATELET # BLD AUTO: 194 10(3)UL (ref 150–450)
PROT UR STRIP.AUTO-MCNC: 30 MG/DL
PROTEIN URINE: 30
RBC # BLD AUTO: 3.76 X10(6)UL (ref 3.8–5.1)
RBC #/AREA URNS AUTO: >10 /HPF
RED CELL DISTRIBUTION WIDTH-SD: 44.2 FL (ref 35.1–46.3)
SP GR UR STRIP.AUTO: 1.01 (ref 1–1.03)
SPEC GRAVITY: 1.02 (ref 1–1.03)
URINE CLARITY: CLEAR
UROBILINOGEN UR STRIP.AUTO-MCNC: <2 MG/DL
UROBILINOGEN URINE: 0.2
WBC # BLD AUTO: 10.5 X10(3) UL (ref 4–13)

## 2017-08-13 PROCEDURE — 59025 FETAL NON-STRESS TEST: CPT | Performed by: OBSTETRICS & GYNECOLOGY

## 2017-08-13 PROCEDURE — 99231 SBSQ HOSP IP/OBS SF/LOW 25: CPT | Performed by: OBSTETRICS & GYNECOLOGY

## 2017-08-13 RX ORDER — PHENAZOPYRIDINE HYDROCHLORIDE 200 MG/1
200 TABLET, FILM COATED ORAL 2 TIMES DAILY
Status: DISCONTINUED | OUTPATIENT
Start: 2017-08-13 | End: 2017-08-15

## 2017-08-13 RX ORDER — INDOMETHACIN 50 MG/1
50 CAPSULE ORAL EVERY 6 HOURS
Status: COMPLETED | OUTPATIENT
Start: 2017-08-13 | End: 2017-08-14

## 2017-08-13 RX ORDER — DOCUSATE SODIUM 100 MG/1
100 CAPSULE, LIQUID FILLED ORAL 2 TIMES DAILY
Status: DISCONTINUED | OUTPATIENT
Start: 2017-08-14 | End: 2017-08-17

## 2017-08-13 RX ORDER — CEFUROXIME AXETIL 500 MG/1
500 TABLET ORAL EVERY 12 HOURS SCHEDULED
Status: DISCONTINUED | OUTPATIENT
Start: 2017-08-13 | End: 2017-08-15

## 2017-08-13 NOTE — PROGRESS NOTES
25w 2d    Hematuria this with urinary frequency and dysuria. No back pain. No abdominal pain or cramping. Leaking clear fluid yesterday. No vaginal bleeding, no change in FM.     /56   Pulse 70   Temp 97.9 °F (36.6 °C) (Temporal)   Resp 18   Ht 66\"

## 2017-08-13 NOTE — PROGRESS NOTES
Patient called RN to room. Patient obtained clean catch urine specimen. Blood tinged urine urine noted. Small clots noted in toilet. Patient reports feeling some urinary symptoms, but feels some relief after voiding.   Patient encouraged to drink water

## 2017-08-13 NOTE — PROGRESS NOTES
Called back in for evaluation of source of bleeding. Initially assumed hematuria but has been wearing mindy pads since this morning with BRB of varying amounts. Continues to deny pain, cramping, or contractions.  Continues to have some discomfort with urinat

## 2017-08-13 NOTE — PROGRESS NOTES
Patient called RN to room to reports she has noticed burning with urination, urinary frequency, urinating small amounts of urine and per patient visible blood in urine. Patient denies cramping or tyrell. Reports + fetal movement.   Patient instructed

## 2017-08-14 LAB
INR BLD: 1.02 (ref 0.89–1.11)
PSA SERPL DL<=0.01 NG/ML-MCNC: 13.4 SECONDS (ref 12–14.3)

## 2017-08-14 PROCEDURE — 99231 SBSQ HOSP IP/OBS SF/LOW 25: CPT | Performed by: OBSTETRICS & GYNECOLOGY

## 2017-08-14 RX ORDER — SODIUM CHLORIDE, SODIUM LACTATE, POTASSIUM CHLORIDE, CALCIUM CHLORIDE 600; 310; 30; 20 MG/100ML; MG/100ML; MG/100ML; MG/100ML
INJECTION, SOLUTION INTRAVENOUS CONTINUOUS
Status: DISCONTINUED | OUTPATIENT
Start: 2017-08-14 | End: 2017-08-17

## 2017-08-14 RX ORDER — DEXTROSE, SODIUM CHLORIDE, SODIUM LACTATE, POTASSIUM CHLORIDE, AND CALCIUM CHLORIDE 5; .6; .31; .03; .02 G/100ML; G/100ML; G/100ML; G/100ML; G/100ML
INJECTION, SOLUTION INTRAVENOUS CONTINUOUS PRN
Status: DISCONTINUED | OUTPATIENT
Start: 2017-08-14 | End: 2017-08-17

## 2017-08-14 NOTE — PROGRESS NOTES
FHR decel noted upon return from [facility] @ 0603, w/ baseline then climbing to 190's. Position changes & O2 did not resolve tachycardia, & 2nd decel noted @ 0625 w/ baseline again climbing to 190's.  Dr Kylah Cohen paged & given phone report, orders received to start

## 2017-08-14 NOTE — IMAGING NOTE
Indication: pprom. ____________________________________________________________________________  History: Age: 36 years. : 2 Para: 1.  Last menstrual period: 2017.  ____________________________________________________________________________

## 2017-08-14 NOTE — PROGRESS NOTES
25w 3d  Feeling better, good FM  Afebrile, VS stable  Labs normal  Abdomen soft, non-tender, FHT's now in 160's (was 180)  Urine culture negative 24 hours  CPM, ?PO, will wait till MFM sees patient

## 2017-08-14 NOTE — CONSULTS
[facility]  Maternal Fetal Medicine Progress Note    Erven Colder Patient Status:  Inpatient    10/15/1985 MRN [id number]   Location [address] [facility] Attending Valorie Garcia MD   Hosp Day # 15 PCP Griselda Hooks MD     [facility] · Continue inpatient management  · fetal monitoring to one hour three times daily. · May eat      Questions/concerns were discussed with patient and/or family at bedside.     Total Time spent with patient and coordinating care:  20 minutes  Discussed wi

## 2017-08-14 NOTE — PAYOR COMM NOTE
--------------  CONTINUED STAY REVIEW    Payor: [address] #:  [id number]  Authorization Number: [id number]    Admit date: 7/31/17  Admit time: 7165    Admitting Physician: Iliana Quezada MD  Attending Physician:  Iliana Quezada Dose Route User    8/13/2017 2036 Given 500 mg Oral Dena [facility]      docusate sodium (COLACE) cap 100 mg     Date Action Dose Route User    8/13/2017 2038 Given 100 mg Oral Reesa [facility]    8/13/2017 1022 Given 100 mg Oral Misael, Moniq

## 2017-08-14 NOTE — PLAN OF CARE
ANTEPARTUM/LABOR and DELIVERY    • Maintain pregnancy as long as maternal and/or fetal condition is stable Progressing    • Anxiety is at manageable level Progressing    • Demonstrates ability to cope with hospitalization/illness Progressing        COPING

## 2017-08-15 ENCOUNTER — TELEPHONE (OUTPATIENT)
Dept: OBGYN CLINIC | Facility: CLINIC | Age: 32
End: 2017-08-15

## 2017-08-15 LAB
BASOPHILS # BLD AUTO: 0.05 X10(3) UL (ref 0–0.1)
BASOPHILS NFR BLD AUTO: 0.5 %
EOSINOPHIL # BLD AUTO: 0.12 X10(3) UL (ref 0–0.3)
EOSINOPHIL NFR BLD AUTO: 1.3 %
ERYTHROCYTE [DISTWIDTH] IN BLOOD BY AUTOMATED COUNT: 13.4 % (ref 11.5–16)
HCT VFR BLD AUTO: 32.7 % (ref 34–50)
HGB BLD-MCNC: 11.1 G/DL (ref 12–16)
IMMATURE GRANULOCYTE COUNT: 0.07 X10(3) UL (ref 0–1)
IMMATURE GRANULOCYTE RATIO %: 0.7 %
LYMPHOCYTES # BLD AUTO: 1.85 X10(3) UL (ref 0.9–4)
LYMPHOCYTES NFR BLD AUTO: 19.4 %
MCH RBC QN AUTO: 31.5 PG (ref 27–33.2)
MCHC RBC AUTO-ENTMCNC: 33.9 G/DL (ref 31–37)
MCV RBC AUTO: 92.9 FL (ref 81–100)
MONOCYTES # BLD AUTO: 0.68 X10(3) UL (ref 0.1–0.6)
MONOCYTES NFR BLD AUTO: 7.1 %
NEUTROPHIL ABS PRELIM: 6.76 X10 (3) UL (ref 1.3–6.7)
NEUTROPHILS # BLD AUTO: 6.76 X10(3) UL (ref 1.3–6.7)
NEUTROPHILS NFR BLD AUTO: 71 %
PLATELET # BLD AUTO: 188 10(3)UL (ref 150–450)
RBC # BLD AUTO: 3.52 X10(6)UL (ref 3.8–5.1)
RED CELL DISTRIBUTION WIDTH-SD: 44.9 FL (ref 35.1–46.3)
WBC # BLD AUTO: 9.5 X10(3) UL (ref 4–13)

## 2017-08-15 PROCEDURE — 99231 SBSQ HOSP IP/OBS SF/LOW 25: CPT | Performed by: OBSTETRICS & GYNECOLOGY

## 2017-08-15 NOTE — CM/SW NOTE
Team rounds done. Team reviewed patient orders, patient plan of care, and patient any discharge plan of care. Team present: STAN White 19.

## 2017-08-15 NOTE — PROGRESS NOTES
25w 4d  No C/O, bleeding when she gets up  Afebrile, VS stable  Urine culture neg  MFM scan with possible clot by placenta  Good FM  Abdomen soft, non-tender  Possible chronic abruption, CPM

## 2017-08-15 NOTE — TELEPHONE ENCOUNTER
Pt is presently in hospital and handed [place] paperwork to Dr Dougherty Pouch complete and call patient for further instructions

## 2017-08-16 PROCEDURE — 59025 FETAL NON-STRESS TEST: CPT | Performed by: OBSTETRICS & GYNECOLOGY

## 2017-08-16 PROCEDURE — 99231 SBSQ HOSP IP/OBS SF/LOW 25: CPT | Performed by: OBSTETRICS & GYNECOLOGY

## 2017-08-16 RX ORDER — HYDROMORPHONE HYDROCHLORIDE 1 MG/ML
0.5 INJECTION, SOLUTION INTRAMUSCULAR; INTRAVENOUS; SUBCUTANEOUS ONCE
Status: COMPLETED | OUTPATIENT
Start: 2017-08-16 | End: 2017-08-16

## 2017-08-16 RX ORDER — INDOMETHACIN 50 MG/1
50 CAPSULE ORAL EVERY 6 HOURS
Status: DISCONTINUED | OUTPATIENT
Start: 2017-08-16 | End: 2017-08-17

## 2017-08-16 NOTE — TELEPHONE ENCOUNTER
Pt calling back with Phone # to send [facility]  Fax #[phone] [facility]

## 2017-08-16 NOTE — CONSULTS
[facility]  Maternal Fetal Medicine Progress Note    Lennox Nathaniel Patient Status:  Inpatient    10/15/1985 MRN [id number]   Location [address] [facility] Attending Estee Guzman MD   Hosp Day # 12 PCP Sary Holcomb MD     [address] [facility] PM

## 2017-08-16 NOTE — PROGRESS NOTES
EGA 25w 5d    Having irritability and some contractions. Worst yesterday. Still with bloody drainage on pads. FM unchanged.     /56 (BP Location: Right arm)   Pulse 73   Temp 98 °F (36.7 °C) (Oral)   Resp 16   Ht 66\"   Wt 174 lb   LMP 02/17/2017 ([facility]

## 2017-08-16 NOTE — PLAN OF CARE
Maintain pregnancy as long as maternal and/or fetal condition is stable Progressing      Anxiety is at manageable level Progressing      Demonstrates ability to cope with hospitalization/illness Progressing      Pt/Family able to verbalize concerns and dem

## 2017-08-16 NOTE — PLAN OF CARE
Problem: ANTEPARTUM/LABOR and DELIVERY  Goal: Anxiety is at manageable level  INTERVENTIONS:  - [place] patient to unit/surroundings  - Establish a trusting relationship with patient  - Discuss possible complications or alterations in birth plan  - Explain

## 2017-08-17 ENCOUNTER — MED REC SCAN ONLY (OUTPATIENT)
Dept: OBGYN CLINIC | Facility: CLINIC | Age: 32
End: 2017-08-17

## 2017-08-17 LAB
ANTIBODY SCREEN: NEGATIVE
EST. AVERAGE GLUCOSE BLD GHB EST-MCNC: 88 MG/DL (ref 68–126)
HBA1C MFR BLD HPLC: 4.7 % (ref ?–5.7)
RH BLOOD TYPE: POSITIVE

## 2017-08-17 PROCEDURE — 59409 OBSTETRICAL CARE: CPT | Performed by: OBSTETRICS & GYNECOLOGY

## 2017-08-17 RX ORDER — ZOLPIDEM TARTRATE 5 MG/1
5 TABLET ORAL NIGHTLY PRN
Status: DISCONTINUED | OUTPATIENT
Start: 2017-08-17 | End: 2017-08-18

## 2017-08-17 RX ORDER — SIMETHICONE 80 MG
80 TABLET,CHEWABLE ORAL 3 TIMES DAILY PRN
Status: DISCONTINUED | OUTPATIENT
Start: 2017-08-17 | End: 2017-08-18

## 2017-08-17 RX ORDER — NALBUPHINE HCL 10 MG/ML
2.5 AMPUL (ML) INJECTION
Status: DISCONTINUED | OUTPATIENT
Start: 2017-08-17 | End: 2017-08-18

## 2017-08-17 RX ORDER — CALCIUM GLUCONATE 94 MG/ML
1 INJECTION, SOLUTION INTRAVENOUS ONCE AS NEEDED
Status: DISCONTINUED | OUTPATIENT
Start: 2017-08-17 | End: 2017-08-17

## 2017-08-17 RX ORDER — ACETAMINOPHEN 325 MG/1
650 TABLET ORAL EVERY 4 HOURS PRN
Status: DISCONTINUED | OUTPATIENT
Start: 2017-08-17 | End: 2017-08-18

## 2017-08-17 RX ORDER — HYDROMORPHONE HYDROCHLORIDE 1 MG/ML
1 INJECTION, SOLUTION INTRAMUSCULAR; INTRAVENOUS; SUBCUTANEOUS ONCE
Status: COMPLETED | OUTPATIENT
Start: 2017-08-17 | End: 2017-08-17

## 2017-08-17 RX ORDER — HYDROCODONE BITARTRATE AND ACETAMINOPHEN 5; 325 MG/1; MG/1
2 TABLET ORAL EVERY 4 HOURS PRN
Status: DISCONTINUED | OUTPATIENT
Start: 2017-08-17 | End: 2017-08-18

## 2017-08-17 RX ORDER — EPHEDRINE SULFATE 50 MG/ML
5 INJECTION, SOLUTION INTRAVENOUS AS NEEDED
Status: DISCONTINUED | OUTPATIENT
Start: 2017-08-17 | End: 2017-08-17

## 2017-08-17 RX ORDER — DOCUSATE SODIUM 100 MG/1
100 CAPSULE, LIQUID FILLED ORAL
Status: DISCONTINUED | OUTPATIENT
Start: 2017-08-17 | End: 2017-08-18

## 2017-08-17 RX ORDER — OXYTOCIN 10 [USP'U]/ML
INJECTION, SOLUTION INTRAMUSCULAR; INTRAVENOUS
Status: DISPENSED
Start: 2017-08-17 | End: 2017-08-17

## 2017-08-17 RX ORDER — OXYTOCIN 10 [USP'U]/ML
10 INJECTION, SOLUTION INTRAMUSCULAR; INTRAVENOUS ONCE
Status: COMPLETED | OUTPATIENT
Start: 2017-08-17 | End: 2017-08-17

## 2017-08-17 RX ORDER — BISACODYL 10 MG
10 SUPPOSITORY, RECTAL RECTAL ONCE AS NEEDED
Status: ACTIVE | OUTPATIENT
Start: 2017-08-17 | End: 2017-08-17

## 2017-08-17 RX ORDER — HYDROCODONE BITARTRATE AND ACETAMINOPHEN 5; 325 MG/1; MG/1
1 TABLET ORAL EVERY 4 HOURS PRN
Status: DISCONTINUED | OUTPATIENT
Start: 2017-08-17 | End: 2017-08-18

## 2017-08-17 RX ORDER — OXYTOCIN 10 [USP'U]/ML
INJECTION, SOLUTION INTRAMUSCULAR; INTRAVENOUS
Status: COMPLETED
Start: 2017-08-17 | End: 2017-08-17

## 2017-08-17 RX ORDER — IBUPROFEN 600 MG/1
600 TABLET ORAL EVERY 6 HOURS
Status: DISCONTINUED | OUTPATIENT
Start: 2017-08-17 | End: 2017-08-18

## 2017-08-17 RX ORDER — OXYTOCIN 10 [USP'U]/ML
10 INJECTION, SOLUTION INTRAMUSCULAR; INTRAVENOUS ONCE
Status: DISCONTINUED | OUTPATIENT
Start: 2017-08-17 | End: 2017-08-17

## 2017-08-17 RX ORDER — HYDROMORPHONE HYDROCHLORIDE 1 MG/ML
INJECTION, SOLUTION INTRAMUSCULAR; INTRAVENOUS; SUBCUTANEOUS
Status: COMPLETED
Start: 2017-08-17 | End: 2017-08-17

## 2017-08-17 NOTE — PROGRESS NOTES
NURSING ADMISSION NOTE      Patient admitted via Wheelchair after visit to NICU. Oriented to room. Safety precautions initiated. Bed in low position. Call light in reach.

## 2017-08-17 NOTE — PROGRESS NOTES
Vaginal Delivery Note          Marcella Long Patient Status:  Inpatient    10/15/1985 MRN [id number]   Location [facility] Attending Azeem Davies, [address] [facility] Se Day # 17 [facility] MON

## 2017-08-17 NOTE — PLAN OF CARE
POSTPARTUM    • Long Term Goal:Experiences normal postpartum course Progressing    • Establishment of adequate milk supply with medication/procedure interruptions Progressing    • Appropriate maternal -  bonding Progressing

## 2017-08-17 NOTE — PROGRESS NOTES
At the request of Dr. Lety Sung, pt brought to NICU to visit with infant. RN remains at bedside with patient. Patient accompanied back to mother/baby room.  Handoff given to Jayne Opitz

## 2017-08-17 NOTE — PROGRESS NOTES
Called to see pt due to increase in abdominal pain and possible labor. Began around 1030 pm. Did have some more vaginal bleeding with voiding. Difficult to palpate and monitor contractions per nurse.  Was given dose of Dilaudid with some relief and did fall

## 2017-08-17 NOTE — PROGRESS NOTES
[facility]  Post-Partum Progress Note    Cass Lees Patient Status:  Inpatient    10/15/1985 MRN [id number]   [facility] [place] 2SW-J Attending Annalise Lopez MD   Hosp Day # 16 PCP Katerine Adair MD     SUBJECTIVE:    Postpartum Day 0:

## 2017-08-17 NOTE — PROGRESS NOTES
Received report from Manju, [address] [facility]. RN to bedside. Pt resting comfortably in bed. Reports +fetal movement. Pt feels occ UC's. Denies any need for pain medication. POC discussed with patient. Pt verbalized understanding. Call light within reach.  Will continue

## 2017-08-17 NOTE — L&D DELIVERY NOTE
[address] [place]     PATIENT'S NAME: Lyric Vera   ATTENDING PHYSICIAN: Tere Pérez M.D.    PATIENT ACCOUNT #: [de-identified] LOCATION:  [address] [place] [address]   MEDICAL RECORD #: [id number] YOB: 1985   ADMISSION DATE: 07/31/2017 DELIVERY DATE: the guidance of Dr. Jeniffer Quintanilla was present at the time of delivery. No Apgars were done. Placenta required manual removal secondary to bleeding.   It appeared to be fibrotic in gross inspection, but otherwise abruption could not be confirmed on a gross in

## 2017-08-17 NOTE — PROGRESS NOTES
Comfortable on epidural. Complete and +1. Too late to place [facility].  service contacted.  here and will start pushing after bladder drained.

## 2017-08-18 VITALS
SYSTOLIC BLOOD PRESSURE: 108 MMHG | BODY MASS INDEX: 27.97 KG/M2 | DIASTOLIC BLOOD PRESSURE: 71 MMHG | HEIGHT: 66 IN | RESPIRATION RATE: 18 BRPM | HEART RATE: 85 BPM | WEIGHT: 174 LBS | TEMPERATURE: 98 F

## 2017-08-18 PROBLEM — O42.90 PROM (PREMATURE RUPTURE OF MEMBRANES) (HCC): Status: RESOLVED | Noted: 2017-07-18 | Resolved: 2017-08-18

## 2017-08-18 PROBLEM — O42.90 PROM (PREMATURE RUPTURE OF MEMBRANES): Status: RESOLVED | Noted: 2017-07-18 | Resolved: 2017-08-18

## 2017-08-18 LAB
BASOPHILS # BLD AUTO: 0.02 X10(3) UL (ref 0–0.1)
BASOPHILS NFR BLD AUTO: 0.2 %
EOSINOPHIL # BLD AUTO: 0.13 X10(3) UL (ref 0–0.3)
EOSINOPHIL NFR BLD AUTO: 1.4 %
ERYTHROCYTE [DISTWIDTH] IN BLOOD BY AUTOMATED COUNT: 13.3 % (ref 11.5–16)
HCT VFR BLD AUTO: 32 % (ref 34–50)
HGB BLD-MCNC: 10.7 G/DL (ref 12–16)
IMMATURE GRANULOCYTE COUNT: 0.06 X10(3) UL (ref 0–1)
IMMATURE GRANULOCYTE RATIO %: 0.6 %
LYMPHOCYTES # BLD AUTO: 2.38 X10(3) UL (ref 0.9–4)
LYMPHOCYTES NFR BLD AUTO: 25.7 %
MCH RBC QN AUTO: 31.3 PG (ref 27–33.2)
MCHC RBC AUTO-ENTMCNC: 33.4 G/DL (ref 31–37)
MCV RBC AUTO: 93.6 FL (ref 81–100)
MONOCYTES # BLD AUTO: 0.52 X10(3) UL (ref 0.1–0.6)
MONOCYTES NFR BLD AUTO: 5.6 %
NEUTROPHIL ABS PRELIM: 6.16 X10 (3) UL (ref 1.3–6.7)
NEUTROPHILS # BLD AUTO: 6.16 X10(3) UL (ref 1.3–6.7)
NEUTROPHILS NFR BLD AUTO: 66.5 %
PLATELET # BLD AUTO: 130 10(3)UL (ref 150–450)
RBC # BLD AUTO: 3.42 X10(6)UL (ref 3.8–5.1)
RED CELL DISTRIBUTION WIDTH-SD: 45.1 FL (ref 35.1–46.3)
WBC # BLD AUTO: 9.3 X10(3) UL (ref 4–13)

## 2017-08-18 NOTE — DISCHARGE SUMMARY
[facility]  Discharge Summary    Trisha Rodriguez Patient Status:  Inpatient    10/15/1985 MRN [id number]   [facility] 2SW-J Attending Yecenia Alcantar MD   Hosp Day # 25 PCP Ivy Sanchez MD         [facility]: Estimated Date of Delivery:

## 2017-08-18 NOTE — PROGRESS NOTES
08/18/17 1203   Clinical Encounter Type   Visited With Health care provider;Patient not available  (Patient with behavioral health for assessement.)   Routine Visit ( available for continuity of care at pager [phone], as needed/requested.  )

## 2017-08-18 NOTE — PROGRESS NOTES
[facility]  Post-Partum Progress Note    Eulalia Campuzano Patient Status:  Inpatient    10/15/1985 MRN [id number]   [facility] 2SW-J Attending Stuart Foley MD   Hosp Day # 25 PCP Iraj Childs MD     SUBJECTIVE:    Postpartum Day 1:

## 2017-08-18 NOTE — BH PROGRESS NOTE
VIVIANA PPD SCREENING    Reason for Referral: This patient was referred for further behavioral health assessment due to EPDS of 15 following extended hospitalization on antepartum unit due to premature labor. She is known to writer from that unit.  She did del the Baby involved: Yes, he will be off work for the next few days and agrees that he will be spoke person for them as patient wishes privacy from social contacts, extended family at this time.    Has Familial Support: Yes    Has Other Support Network: Large

## 2017-08-18 NOTE — CM/SW NOTE
SW met with pt and , Real Leyden, to provide support and encouragement due to NICU admission of baby girl, Ying Paiz, born at 22 weeks.  Pt Abby Evans is known to SW due to antepartum admission prior to birth of baby.     SW reviewed support services for the NICU

## 2017-08-21 ENCOUNTER — TELEPHONE (OUTPATIENT)
Dept: OBGYN UNIT | Facility: HOSPITAL | Age: 32
End: 2017-08-21

## 2017-08-22 ENCOUNTER — TELEPHONE (OUTPATIENT)
Dept: OBGYN UNIT | Facility: HOSPITAL | Age: 32
End: 2017-08-22

## 2017-09-20 ENCOUNTER — OFFICE VISIT (OUTPATIENT)
Dept: OBGYN CLINIC | Facility: CLINIC | Age: 32
End: 2017-09-20

## 2017-09-20 VITALS
SYSTOLIC BLOOD PRESSURE: 100 MMHG | HEIGHT: 66 IN | WEIGHT: 168 LBS | DIASTOLIC BLOOD PRESSURE: 70 MMHG | BODY MASS INDEX: 27 KG/M2

## 2017-09-20 DIAGNOSIS — Z23 NEED FOR VACCINATION: ICD-10-CM

## 2017-09-20 PROBLEM — O36.8390: Status: RESOLVED | Noted: 2017-07-25 | Resolved: 2017-09-20

## 2017-09-20 PROBLEM — Z34.90 PREGNANCY: Status: RESOLVED | Noted: 2017-07-22 | Resolved: 2017-09-20

## 2017-09-20 PROBLEM — O36.8390 ABNORMALITY IN FETAL HEART RATE/RHYTHM, ANTEPARTUM: Status: RESOLVED | Noted: 2017-07-25 | Resolved: 2017-09-20

## 2017-09-20 PROBLEM — Z34.90 PREGNANCY (HCC): Status: RESOLVED | Noted: 2017-07-22 | Resolved: 2017-09-20

## 2017-09-20 PROBLEM — Z34.90 PREGNANCY, OBSTETRICAL CARE: Status: RESOLVED | Noted: 2017-07-15 | Resolved: 2017-09-20

## 2017-09-20 PROBLEM — Z34.90 PREGNANCY, OBSTETRICAL CARE (HCC): Status: RESOLVED | Noted: 2017-07-15 | Resolved: 2017-09-20

## 2017-09-20 PROCEDURE — 90686 IIV4 VACC NO PRSV 0.5 ML IM: CPT | Performed by: OBSTETRICS & GYNECOLOGY

## 2017-09-20 PROCEDURE — 90471 IMMUNIZATION ADMIN: CPT | Performed by: OBSTETRICS & GYNECOLOGY

## 2017-10-16 ENCOUNTER — TELEPHONE (OUTPATIENT)
Dept: PEDIATRICS CLINIC | Facility: CLINIC | Age: 32
End: 2017-10-16

## 2017-10-16 NOTE — TELEPHONE ENCOUNTER
PT DELIVERED 8-17-17. PT'S CARE WAS TRANSFERRED AND SHE DELIVERED AT [facility]. C/O CLOGGED MILK DUCT SINCE LAST NIGHT.   HAS HAPPENED BEFORE AND HAS ALWAYS RESOLVED WITH NURSING ON AFFECTED BREAST FIRST ALONG WITH MASSAGE AND WARM PACKS AND PUMPING A LIT

## 2017-10-17 NOTE — TELEPHONE ENCOUNTER
MESSAGE REVIEWED WITH KCB OVER THE PHONE.  SHE ORDERED DICLOXICILLIN 500MG Q 6 HOURS FOR 7 DAYS. RX SENT. PT NOTIFIED.

## 2017-10-19 ENCOUNTER — TELEPHONE (OUTPATIENT)
Dept: OBGYN CLINIC | Facility: CLINIC | Age: 32
End: 2017-10-19

## 2017-10-19 NOTE — TELEPHONE ENCOUNTER
Pt states she is breast feeding and pumping and asking if she can take Day Quill and Night Geannie Like while also on Dicloxacillin for mastitis. Pt informed the Day and night Quill are L1 and safe to use while breast feeding.  Also advised pt okay to use while on

## 2018-02-01 ENCOUNTER — OFFICE VISIT (OUTPATIENT)
Dept: OBGYN CLINIC | Facility: CLINIC | Age: 33
End: 2018-02-01

## 2018-02-01 VITALS
HEART RATE: 75 BPM | WEIGHT: 157.13 LBS | SYSTOLIC BLOOD PRESSURE: 90 MMHG | BODY MASS INDEX: 26.18 KG/M2 | RESPIRATION RATE: 16 BRPM | DIASTOLIC BLOOD PRESSURE: 60 MMHG | TEMPERATURE: 98 F | HEIGHT: 65 IN

## 2018-02-01 DIAGNOSIS — Z01.419 ENCOUNTER FOR GYNECOLOGICAL EXAMINATION WITHOUT ABNORMAL FINDING: Primary | ICD-10-CM

## 2018-02-01 DIAGNOSIS — Z87.51 HISTORY OF PRETERM DELIVERY: ICD-10-CM

## 2018-02-01 PROCEDURE — 99395 PREV VISIT EST AGE 18-39: CPT | Performed by: OBSTETRICS & GYNECOLOGY

## 2018-02-01 NOTE — PROGRESS NOTES
Patient ID:   Brock Guardado  is a 29year old female         HPI:     Here for general gyn exam. Last seen for postpartum visit in September. Daughter home from NICU at end of November. Still with feeding tube but otherwise okay.     New medical/duran sounds. Pulmonary/Chest: Clear to auscultation. Breath sounds normal.  Breasts:   Symmetrical.  Soft tissue with homogeneous texture. No palpable abnormalities, tenderness, secretions, or adenopathy. Abdominal: Soft.  Liver span normal. No tenderness o

## 2018-12-06 ENCOUNTER — IMMUNIZATION (OUTPATIENT)
Dept: PEDIATRICS CLINIC | Facility: CLINIC | Age: 33
End: 2018-12-06

## 2018-12-06 DIAGNOSIS — Z23 NEED FOR VACCINATION: ICD-10-CM

## 2018-12-06 PROCEDURE — 90686 IIV4 VACC NO PRSV 0.5 ML IM: CPT | Performed by: PEDIATRICS

## 2018-12-06 PROCEDURE — 90471 IMMUNIZATION ADMIN: CPT | Performed by: PEDIATRICS

## 2019-02-04 ENCOUNTER — OFFICE VISIT (OUTPATIENT)
Dept: OBGYN CLINIC | Facility: CLINIC | Age: 34
End: 2019-02-04

## 2019-02-04 VITALS
BODY MASS INDEX: 22.66 KG/M2 | HEIGHT: 66 IN | HEART RATE: 77 BPM | DIASTOLIC BLOOD PRESSURE: 60 MMHG | SYSTOLIC BLOOD PRESSURE: 112 MMHG | WEIGHT: 141 LBS

## 2019-02-04 DIAGNOSIS — Z12.4 CERVICAL CANCER SCREENING: ICD-10-CM

## 2019-02-04 DIAGNOSIS — N92.0 MENORRHAGIA WITH REGULAR CYCLE: ICD-10-CM

## 2019-02-04 DIAGNOSIS — Z01.419 WELL WOMAN EXAM WITH ROUTINE GYNECOLOGICAL EXAM: Primary | ICD-10-CM

## 2019-02-04 PROCEDURE — 88175 CYTOPATH C/V AUTO FLUID REDO: CPT | Performed by: NURSE PRACTITIONER

## 2019-02-04 PROCEDURE — 99395 PREV VISIT EST AGE 18-39: CPT | Performed by: NURSE PRACTITIONER

## 2019-02-04 PROCEDURE — 87624 HPV HI-RISK TYP POOLED RSLT: CPT | Performed by: NURSE PRACTITIONER

## 2019-02-05 NOTE — PROGRESS NOTES
Here for Routine Annual Exam.  Menses are heavy in the last few months. Contraception- none noted. No C/O    ROS: No Cardiac, Respiratory, GI,  or Neurological symptoms.     PE:  GENERAL: well developed, well nourished, in no apparent distress  SKI

## 2019-02-05 NOTE — PROGRESS NOTES
Discharge patient home as order. Teaching complete, patient feel comfortable to take care herself. Baby in NICU. All questions are addressed. Sent patient to her family with wheelchair @ [phone]. DISPLAY PLAN FREE TEXT

## 2019-02-06 LAB — HPV I/H RISK 1 DNA SPEC QL NAA+PROBE: NEGATIVE

## 2019-03-18 ENCOUNTER — TELEPHONE (OUTPATIENT)
Dept: OBGYN CLINIC | Facility: CLINIC | Age: 34
End: 2019-03-18

## 2019-03-18 NOTE — TELEPHONE ENCOUNTER
Patient called and stated had appt w/ Joseph Avendano recently and forgot to ask her to put a referral for MFM . They are going to try to conceive and needs referral /order put into system because her baby was 25 weeks last time.     Thank you

## 2019-03-18 NOTE — TELEPHONE ENCOUNTER
I spoke with an MD in the office and they said she does not need to see her prior to conception, just once she establishes care here for the pregnancy we will send a referral for early pregnancy.

## 2019-03-18 NOTE — TELEPHONE ENCOUNTER
Routed to Yury Nettles. Please see note and advise if appropriate for order/referral and what type of appt needed.

## 2019-07-28 ENCOUNTER — HOSPITAL ENCOUNTER (OUTPATIENT)
Age: 34
Discharge: HOME | End: 2019-07-28
Payer: COMMERCIAL

## 2019-07-28 VITALS
HEART RATE: 78 BPM | RESPIRATION RATE: 20 BRPM | SYSTOLIC BLOOD PRESSURE: 104 MMHG | HEIGHT: 66 IN | OXYGEN SATURATION: 100 % | WEIGHT: 145 LBS | DIASTOLIC BLOOD PRESSURE: 72 MMHG | BODY MASS INDEX: 23.3 KG/M2 | TEMPERATURE: 98 F

## 2019-07-28 DIAGNOSIS — J22 LOWER RESPIRATORY INFECTION: Primary | ICD-10-CM

## 2019-07-28 LAB — S PYO AG THROAT QL: NEGATIVE

## 2019-07-28 PROCEDURE — 87430 STREP A AG IA: CPT

## 2019-07-28 PROCEDURE — 99204 OFFICE O/P NEW MOD 45 MIN: CPT

## 2019-07-28 PROCEDURE — 99213 OFFICE O/P EST LOW 20 MIN: CPT

## 2019-07-28 RX ORDER — BENZONATATE 100 MG/1
100 CAPSULE ORAL 3 TIMES DAILY PRN
Qty: 30 CAPSULE | Refills: 0 | Status: SHIPPED | OUTPATIENT
Start: 2019-07-28 | End: 2019-08-27

## 2019-07-28 RX ORDER — AZITHROMYCIN 250 MG/1
TABLET, FILM COATED ORAL
Qty: 1 PACKAGE | Refills: 0 | Status: SHIPPED | OUTPATIENT
Start: 2019-07-28 | End: 2019-08-02

## 2019-07-28 RX ORDER — CODEINE PHOSPHATE AND GUAIFENESIN 10; 100 MG/5ML; MG/5ML
5 SOLUTION ORAL EVERY 6 HOURS PRN
Qty: 100 ML | Refills: 0 | Status: SHIPPED | OUTPATIENT
Start: 2019-07-28 | End: 2019-08-02

## 2019-07-28 RX ORDER — POLYMYXIN B SULFATE AND TRIMETHOPRIM 1; 10000 MG/ML; [USP'U]/ML
1 SOLUTION OPHTHALMIC
Qty: 10 ML | Refills: 0 | Status: SHIPPED | OUTPATIENT
Start: 2019-07-28 | End: 2019-07-28 | Stop reason: CLARIF

## 2019-07-28 NOTE — ED INITIAL ASSESSMENT (HPI)
1 week of dry cough, bilateral ear pain, and sore throat. No fever. daughter dx with pneumonia couple weeks ago. No N/V/D.

## 2019-07-28 NOTE — ED PROVIDER NOTES
Patient presents with:  Cough/URI      HPI:     Trisha Rodriguez is a 34year old female with no significant past medical history presents with a chief complaint of dry cough, bilateral ear pain and sore throat. Pt reports symptoms have lasted over 1 week. this is most likely secondary to viral etiology. She is not hypoxic here, lungs clear bilaterally. I do not believe any x-ray imaging is warranted at this time.   Discussed with the patient we will use Flonase, Tessalon Perles and will give a prescription

## 2020-02-18 ENCOUNTER — HOSPITAL ENCOUNTER (OUTPATIENT)
Age: 35
Discharge: HOME | End: 2020-02-18
Attending: EMERGENCY MEDICINE

## 2020-02-18 VITALS
TEMPERATURE: 98 F | RESPIRATION RATE: 20 BRPM | HEART RATE: 87 BPM | WEIGHT: 168 LBS | DIASTOLIC BLOOD PRESSURE: 58 MMHG | OXYGEN SATURATION: 100 % | BODY MASS INDEX: 27 KG/M2 | SYSTOLIC BLOOD PRESSURE: 98 MMHG

## 2020-02-18 DIAGNOSIS — J01.90 ACUTE SINUSITIS, RECURRENCE NOT SPECIFIED, UNSPECIFIED LOCATION: Primary | ICD-10-CM

## 2020-02-18 PROCEDURE — 99214 OFFICE O/P EST MOD 30 MIN: CPT

## 2020-02-18 PROCEDURE — 99213 OFFICE O/P EST LOW 20 MIN: CPT

## 2020-02-18 RX ORDER — AMOXICILLIN 875 MG/1
875 TABLET, COATED ORAL 2 TIMES DAILY
Qty: 20 TABLET | Refills: 0 | Status: SHIPPED | OUTPATIENT
Start: 2020-02-18 | End: 2020-02-28

## 2020-02-19 NOTE — ED PROVIDER NOTES
Patient Seen in: [facility]      History   Patient presents with:  Cough/URI    Stated Complaint: sinus    HPI    This patient states she has been having sinus symptoms for the past week. The patient has had a cough.   Trinity Paulino normal first and second heart sounds  Neurologic there are no gross cranial nerve deficits patient moves all 4 extremities without impairment            MDM     Discussed with patient symptoms may be viral in etiology and antibiotics not beneficial.  Advis

## 2023-02-20 ENCOUNTER — LAB ENCOUNTER (OUTPATIENT)
Dept: LAB | Age: 38
End: 2023-02-20
Attending: INTERNAL MEDICINE
Payer: COMMERCIAL

## 2023-02-20 ENCOUNTER — OFFICE VISIT (OUTPATIENT)
Dept: INTERNAL MEDICINE CLINIC | Facility: CLINIC | Age: 38
End: 2023-02-20

## 2023-02-20 VITALS
DIASTOLIC BLOOD PRESSURE: 76 MMHG | WEIGHT: 147 LBS | HEIGHT: 66 IN | HEART RATE: 80 BPM | SYSTOLIC BLOOD PRESSURE: 92 MMHG | OXYGEN SATURATION: 99 % | BODY MASS INDEX: 23.63 KG/M2

## 2023-02-20 DIAGNOSIS — Z13.21 ENCOUNTER FOR VITAMIN DEFICIENCY SCREENING: ICD-10-CM

## 2023-02-20 DIAGNOSIS — Z13.220 LIPID SCREENING: ICD-10-CM

## 2023-02-20 DIAGNOSIS — Z13.29 THYROID DISORDER SCREEN: ICD-10-CM

## 2023-02-20 DIAGNOSIS — Z12.4 CERVICAL CANCER SCREENING: ICD-10-CM

## 2023-02-20 DIAGNOSIS — Z28.21 IMMUNIZATION NOT CARRIED OUT BECAUSE OF PATIENT REFUSAL: ICD-10-CM

## 2023-02-20 DIAGNOSIS — Z13.0 SCREENING FOR DEFICIENCY ANEMIA: ICD-10-CM

## 2023-02-20 DIAGNOSIS — R79.89 LOW VITAMIN D LEVEL: ICD-10-CM

## 2023-02-20 DIAGNOSIS — R73.09 ELEVATED GLUCOSE: ICD-10-CM

## 2023-02-20 DIAGNOSIS — Z00.00 WELLNESS EXAMINATION: ICD-10-CM

## 2023-02-20 DIAGNOSIS — I83.90 VARICOSE VEIN: ICD-10-CM

## 2023-02-20 DIAGNOSIS — Z00.00 WELLNESS EXAMINATION: Primary | ICD-10-CM

## 2023-02-20 LAB
ALBUMIN SERPL-MCNC: 3.8 G/DL (ref 3.4–5)
ALBUMIN/GLOB SERPL: 1.2 {RATIO} (ref 1–2)
ALP LIVER SERPL-CCNC: 72 U/L
ALT SERPL-CCNC: 30 U/L
ANION GAP SERPL CALC-SCNC: 8 MMOL/L (ref 0–18)
AST SERPL-CCNC: 29 U/L (ref 15–37)
BASOPHILS # BLD AUTO: 0.03 X10(3) UL (ref 0–0.2)
BASOPHILS NFR BLD AUTO: 0.5 %
BILIRUB SERPL-MCNC: 0.2 MG/DL (ref 0.1–2)
BUN BLD-MCNC: 16 MG/DL (ref 7–18)
BUN/CREAT SERPL: 19.5 (ref 10–20)
CALCIUM BLD-MCNC: 8.9 MG/DL (ref 8.5–10.1)
CHLORIDE SERPL-SCNC: 110 MMOL/L (ref 98–112)
CHOLEST SERPL-MCNC: 128 MG/DL (ref ?–200)
CO2 SERPL-SCNC: 23 MMOL/L (ref 21–32)
CREAT BLD-MCNC: 0.82 MG/DL
DEPRECATED RDW RBC AUTO: 40.6 FL (ref 35.1–46.3)
EOSINOPHIL # BLD AUTO: 0.12 X10(3) UL (ref 0–0.7)
EOSINOPHIL NFR BLD AUTO: 2.1 %
ERYTHROCYTE [DISTWIDTH] IN BLOOD BY AUTOMATED COUNT: 12.2 % (ref 11–15)
EST. AVERAGE GLUCOSE BLD GHB EST-MCNC: 97 MG/DL (ref 68–126)
FASTING PATIENT LIPID ANSWER: NO
FASTING STATUS PATIENT QL REPORTED: NO
GFR SERPLBLD BASED ON 1.73 SQ M-ARVRAT: 96 ML/MIN/1.73M2 (ref 60–?)
GLOBULIN PLAS-MCNC: 3.1 G/DL (ref 2.8–4.4)
GLUCOSE BLD-MCNC: 91 MG/DL (ref 70–99)
HBA1C MFR BLD: 5 % (ref ?–5.7)
HCT VFR BLD AUTO: 41.3 %
HCV AB SERPL QL IA: NONREACTIVE
HDLC SERPL-MCNC: 75 MG/DL (ref 40–59)
HGB BLD-MCNC: 13.9 G/DL
IMM GRANULOCYTES # BLD AUTO: 0.01 X10(3) UL (ref 0–1)
IMM GRANULOCYTES NFR BLD: 0.2 %
LDLC SERPL CALC-MCNC: 43 MG/DL (ref ?–100)
LYMPHOCYTES # BLD AUTO: 1.67 X10(3) UL (ref 1–4)
LYMPHOCYTES NFR BLD AUTO: 28.9 %
MCH RBC QN AUTO: 30.7 PG (ref 26–34)
MCHC RBC AUTO-ENTMCNC: 33.7 G/DL (ref 31–37)
MCV RBC AUTO: 91.2 FL
MONOCYTES # BLD AUTO: 0.4 X10(3) UL (ref 0.1–1)
MONOCYTES NFR BLD AUTO: 6.9 %
NEUTROPHILS # BLD AUTO: 3.54 X10 (3) UL (ref 1.5–7.7)
NEUTROPHILS # BLD AUTO: 3.54 X10(3) UL (ref 1.5–7.7)
NEUTROPHILS NFR BLD AUTO: 61.4 %
NONHDLC SERPL-MCNC: 53 MG/DL (ref ?–130)
OSMOLALITY SERPL CALC.SUM OF ELEC: 293 MOSM/KG (ref 275–295)
PLATELET # BLD AUTO: 207 10(3)UL (ref 150–450)
POTASSIUM SERPL-SCNC: 3.8 MMOL/L (ref 3.5–5.1)
PROT SERPL-MCNC: 6.9 G/DL (ref 6.4–8.2)
RBC # BLD AUTO: 4.53 X10(6)UL
SODIUM SERPL-SCNC: 141 MMOL/L (ref 136–145)
TRIGL SERPL-MCNC: 41 MG/DL (ref 30–149)
TSI SER-ACNC: 1.13 MIU/ML (ref 0.36–3.74)
VIT D+METAB SERPL-MCNC: 27.6 NG/ML (ref 30–100)
VLDLC SERPL CALC-MCNC: 6 MG/DL (ref 0–30)
WBC # BLD AUTO: 5.8 X10(3) UL (ref 4–11)

## 2023-02-20 PROCEDURE — 99385 PREV VISIT NEW AGE 18-39: CPT | Performed by: INTERNAL MEDICINE

## 2023-02-20 PROCEDURE — 86803 HEPATITIS C AB TEST: CPT

## 2023-02-20 PROCEDURE — 36415 COLL VENOUS BLD VENIPUNCTURE: CPT

## 2023-02-20 PROCEDURE — 87389 HIV-1 AG W/HIV-1&-2 AB AG IA: CPT

## 2023-02-20 PROCEDURE — 84443 ASSAY THYROID STIM HORMONE: CPT

## 2023-02-20 PROCEDURE — 80053 COMPREHEN METABOLIC PANEL: CPT

## 2023-02-20 PROCEDURE — 82306 VITAMIN D 25 HYDROXY: CPT

## 2023-02-20 PROCEDURE — 80061 LIPID PANEL: CPT

## 2023-02-20 PROCEDURE — 3074F SYST BP LT 130 MM HG: CPT | Performed by: INTERNAL MEDICINE

## 2023-02-20 PROCEDURE — 83036 HEMOGLOBIN GLYCOSYLATED A1C: CPT

## 2023-02-20 PROCEDURE — 3008F BODY MASS INDEX DOCD: CPT | Performed by: INTERNAL MEDICINE

## 2023-02-20 PROCEDURE — 3078F DIAST BP <80 MM HG: CPT | Performed by: INTERNAL MEDICINE

## 2023-02-20 PROCEDURE — 85025 COMPLETE CBC W/AUTO DIFF WBC: CPT

## 2023-02-22 ENCOUNTER — TELEPHONE (OUTPATIENT)
Dept: INTERNAL MEDICINE CLINIC | Facility: CLINIC | Age: 38
End: 2023-02-22

## 2023-11-09 ENCOUNTER — ORDER TRANSCRIPTION (OUTPATIENT)
Dept: ADMINISTRATIVE | Facility: HOSPITAL | Age: 38
End: 2023-11-09

## 2023-11-09 DIAGNOSIS — Z12.31 ENCOUNTER FOR SCREENING MAMMOGRAM FOR MALIGNANT NEOPLASM OF BREAST: Primary | ICD-10-CM

## 2023-11-18 ENCOUNTER — HOSPITAL ENCOUNTER (OUTPATIENT)
Dept: MAMMOGRAPHY | Age: 38
Discharge: HOME | End: 2023-11-18
Attending: INTERNAL MEDICINE
Payer: COMMERCIAL

## 2023-11-18 DIAGNOSIS — Z12.31 ENCOUNTER FOR SCREENING MAMMOGRAM FOR MALIGNANT NEOPLASM OF BREAST: ICD-10-CM

## 2023-11-18 PROCEDURE — 77063 BREAST TOMOSYNTHESIS BI: CPT | Performed by: OBSTETRICS & GYNECOLOGY

## 2023-11-18 PROCEDURE — 77067 SCR MAMMO BI INCL CAD: CPT | Performed by: OBSTETRICS & GYNECOLOGY

## 2024-11-13 ENCOUNTER — OFFICE VISIT (OUTPATIENT)
Dept: OBGYN CLINIC | Facility: CLINIC | Age: 39
End: 2024-11-13
Payer: COMMERCIAL

## 2024-11-13 VITALS
HEART RATE: 84 BPM | WEIGHT: 158.25 LBS | SYSTOLIC BLOOD PRESSURE: 100 MMHG | DIASTOLIC BLOOD PRESSURE: 64 MMHG | BODY MASS INDEX: 25.43 KG/M2 | HEIGHT: 66 IN

## 2024-11-13 DIAGNOSIS — Z12.4 CERVICAL CANCER SCREENING: Primary | ICD-10-CM

## 2024-11-13 DIAGNOSIS — N93.9 ABNORMAL UTERINE BLEEDING (AUB): ICD-10-CM

## 2024-11-13 DIAGNOSIS — Z12.39 ENCOUNTER FOR BREAST CANCER SCREENING USING NON-MAMMOGRAM MODALITY: ICD-10-CM

## 2024-11-13 DIAGNOSIS — R92.30 DENSE BREAST TISSUE ON MAMMOGRAM, UNSPECIFIED TYPE: ICD-10-CM

## 2024-11-13 DIAGNOSIS — Z01.419 ENCOUNTER FOR GYNECOLOGICAL EXAMINATION WITHOUT ABNORMAL FINDING: ICD-10-CM

## 2024-11-13 PROCEDURE — 3078F DIAST BP <80 MM HG: CPT | Performed by: STUDENT IN AN ORGANIZED HEALTH CARE EDUCATION/TRAINING PROGRAM

## 2024-11-13 PROCEDURE — 87624 HPV HI-RISK TYP POOLED RSLT: CPT | Performed by: STUDENT IN AN ORGANIZED HEALTH CARE EDUCATION/TRAINING PROGRAM

## 2024-11-13 PROCEDURE — 3074F SYST BP LT 130 MM HG: CPT | Performed by: STUDENT IN AN ORGANIZED HEALTH CARE EDUCATION/TRAINING PROGRAM

## 2024-11-13 PROCEDURE — 99385 PREV VISIT NEW AGE 18-39: CPT | Performed by: STUDENT IN AN ORGANIZED HEALTH CARE EDUCATION/TRAINING PROGRAM

## 2024-11-13 PROCEDURE — 3008F BODY MASS INDEX DOCD: CPT | Performed by: STUDENT IN AN ORGANIZED HEALTH CARE EDUCATION/TRAINING PROGRAM

## 2024-11-13 NOTE — PROGRESS NOTES
Annual Exam (Ages 22-39)    Subjective:    This is a 39 year old  presenting for routine annual exam    Menarche at age 12, cycles every 28 days, last 4-6 days, heavy flow the first/second day. The heaviest day uses pad and tampon changed hourly. Passing grape sized clots. This has been occurring for 1 yr.    Had mammogram last year. Has dense breasts and asks about supplemental imaging.     Hx Prior Abnormal Pap: Yes  Pap Date: 19  Pap Result Notes: wnl  Follow Up Recommendation: laser Using vasectomy for contraception.       Review of Systems   Constitutional: Negative.    HENT: Negative.    Respiratory: Negative.    Gastrointestinal: Negative.    Endocrine: Negative.    Genitourinary: Negative.    Musculoskeletal: Negative.    Skin: Negative.    Allergic/Immunologic: Negative.    Neurological: Negative.      Objective:    Allergies[1]  Past Medical History:    Decorative tattoo    H/O cervical polypectomy    Mild intermittent childhood asthma without complication (HCC)    Smoker    Varicose vein    Varicose veins of legs     No current outpatient medications on file.  Past Surgical History:   Procedure Laterality Date           Family History   Family history unknown: Yes      reports that she quit smoking about 10 years ago. Her smoking use included cigarettes. She started smoking about 15 years ago. She has a 2 pack-year smoking history. She has never used smokeless tobacco. She reports current alcohol use of about 1.7 standard drinks of alcohol per week. She reports that she does not use drugs.    Physical Exam     Vitals:    24 0919   BP: 100/64   Pulse: 84        Constitutional: She is oriented to person, place, and time. She appears well-developed and well-nourished.   Cardiovascular: Normal peripheral perfusion  Breasts: Examined sitting and supine. No cervical, supraclavicular or axillary adenopathy, no masses, skin changes or nipple discharge.  Pulmonary/Chest: Non labored  breathing.  Abdominal: Soft. There is no tenderness.   Genitourinary: Normal appearing external genitalia. Vagina is well estrogenized. Normal appearing urethral meatus. Bartholin's gland normal to palpation. Normal appearing  cervix. Cervix is not friable and with normal appearing discharge. Uterus is 6 weeks size  and non tender. No cervical motion tenderness. Normal adnexa bilaterally without tenderness.  Neurological: She is alert and oriented to person, place, and time.     Assessment and Plan    This is a 39 year old  presenting for annual exam.     #Annual Exam   -Depression screening   Depression Screening (PHQ-2/PHQ-9): Over the LAST 2 WEEKS   Little interest or pleasure in doing things (over the last two weeks)?: Not at all    Feeling down, depressed, or hopeless (over the last two weeks)?: Not at all    PHQ-2 SCORE: 0      -Blood pressure screening normal  -Contraception vasectomy  -Body mass index is 25.54 kg/m².   -STD screening declined  -Urinary incontinence screening neg  -Pap smear collected  -whole breast ultrasound per pt request given dense breasts on mammogram  -CBC, TSH for heavy period.    Anoop Posada MD           [1] No Known Allergies

## 2024-11-14 LAB — HPV E6+E7 MRNA CVX QL NAA+PROBE: NEGATIVE

## 2024-11-19 LAB
.: NORMAL
.: NORMAL

## (undated) NOTE — IP AVS SNAPSHOT
[facility]            (For Outpatient Use Only) Initial Admit Date: 7/18/2017   Inpt/Obs Admit Date: Inpt: N/A / Obs: N/A   Discharge Date:    Hospital Acct:  [de-identified]   MRN: [de-identified]   CSN: [id number]        ENCOUNTER  Patient Class: OU

## (undated) NOTE — LETTER
2017                       To Whom It May Concern: This letter is to inform you that [facility] with  [birth year] is currently pregnant with an estimated due date of 17. Sincerely,    Azeem López.  [address] [place] [address], [facility] ANA

## (undated) NOTE — MR AVS SNAPSHOT
[place]  [address]  [phone]               Thank you for choosing us for your health care visit with Nurse.   We are glad to serve you and happy to provide you with this summary of your vis HIV Ag Ab Combo    Complete by:  Apr 15, 2017 (Approximate)    Assoc Dx:  Encounter for supervision of other normal pregnancy in first trimester [Z34.81]           HCV Antibody    Complete by:  Apr 15, 2017 (Approximate)    Assoc Dx:  Encounter for Junior Capone Your unique [employer] Access Code: [email or web address]  Expires: 6/14/2017 10:49 AM    If you have questions, you can call [phone] to talk to our [facility] Staff. Remember, [employer] is NOT to be used for urgent needs. For medical emergencies, dial 911.

## (undated) NOTE — LETTER
February 22, 2023       [address] [place] [address]      Dear Matthew Kebede:     Test result notes from lab visit on 02/20/23 per Birttany Barbosa MD.       The results from your recent visit: Your vitamin D level is low. Please start taking over-the-counter vitamin D supplements, for example 1000 units daily. Otherwise, blood work looks good. If you have any questions or concerns, please don't hesitate to call.      21 [phone]

## (undated) NOTE — IP AVS SNAPSHOT
[address]  [address] [facility] [phone]                Discharge Summary   5/17/2017    Mague Flowers           Admission Information        Provider Department    5/17/2017 Jeffy Ruiz MD [facility] Scheduling at [phone], Monday through Friday between 7:30am to 6pm and on Saturday between 8am and 1pm. Evening and weekend appointments for your exam are available. Walk-in patients are welcome for most exams.      [facility] you to explore options for quitting.     - If you have concerns related to behavioral health issues or thoughts of harming yourself, contact [facility] at [phone].     - If you don’t have insurance, Alla Ortiz experience these side effects or respond to medications the same. Please call your provider or healthcare team if you have any questions regarding your medications while at home.          All Other Medications     PRENATAL 27-0.8 MG Oral Tab

## (undated) NOTE — IP AVS SNAPSHOT
Patient Demographics     Address  Elda Gomez  Phone  [phone] [facility]  [phone] [facility] E-mail Address  [email or web address]      Emergency Contact(s)     Name Relation Home Work Lenore Qasim Glez 1428 [phone] Video Swallow Study Notes     No notes of this type exist for this encounter. SLP Notes     No notes of this type exist for this encounter.             Immunizations     Name Date      Tb Intradermal Test 05/14/15     Tb Intradermal Test 07/22/13

## (undated) NOTE — LETTER
February 22, 2023     Marlon Janes  [address]      Dear Edwin Khan:    Below are the results from your recent lab visit 02/20/23. If you have any questions or concerns, please don't hesitate to call. Your vitamin D level is low. Please start taking over-the-counter vitamin D supplements, for example 1000 units daily. Otherwise, blood work looks good.